# Patient Record
Sex: FEMALE | Race: WHITE | NOT HISPANIC OR LATINO | Employment: PART TIME | URBAN - METROPOLITAN AREA
[De-identification: names, ages, dates, MRNs, and addresses within clinical notes are randomized per-mention and may not be internally consistent; named-entity substitution may affect disease eponyms.]

---

## 2024-06-01 ENCOUNTER — HOSPITAL ENCOUNTER (EMERGENCY)
Facility: HOSPITAL | Age: 44
Discharge: HOME | End: 2024-06-01
Payer: COMMERCIAL

## 2024-06-01 VITALS
HEART RATE: 84 BPM | RESPIRATION RATE: 16 BRPM | DIASTOLIC BLOOD PRESSURE: 77 MMHG | SYSTOLIC BLOOD PRESSURE: 126 MMHG | WEIGHT: 160 LBS | TEMPERATURE: 98.2 F | BODY MASS INDEX: 26.63 KG/M2 | OXYGEN SATURATION: 97 %

## 2024-06-01 DIAGNOSIS — M65.4 DE QUERVAIN'S TENOSYNOVITIS, RIGHT: Primary | ICD-10-CM

## 2024-06-01 PROCEDURE — 99281 EMR DPT VST MAYX REQ PHY/QHP: CPT

## 2024-06-01 ASSESSMENT — COLUMBIA-SUICIDE SEVERITY RATING SCALE - C-SSRS
6. HAVE YOU EVER DONE ANYTHING, STARTED TO DO ANYTHING, OR PREPARED TO DO ANYTHING TO END YOUR LIFE?: NO
2. HAVE YOU ACTUALLY HAD ANY THOUGHTS OF KILLING YOURSELF?: NO
1. IN THE PAST MONTH, HAVE YOU WISHED YOU WERE DEAD OR WISHED YOU COULD GO TO SLEEP AND NOT WAKE UP?: NO

## 2024-06-01 NOTE — ED PROVIDER NOTES
HPI   Chief Complaint   Patient presents with    Hand Injury     Pt arrive private auto from work. States she injured hand trying to close a window yesterday and now today has pain in hand, wrist, forearm area that is shooting up and down. No open wound or bruising noted. Hx fany Godinez is a 43 y/o female with a past medical history of psoriatic arthritis on humira presenting to the emergency department with right wrist pain.  Patient states that yesterday evening, she was attempting to open her kitchen window when she felt a sharp shooting pain originating in her right thumb with radiation into her right wrist and forearm.  She states that the pain subsided and she was doing okay until this morning at work when she experienced a similar episode.  Patient was attempting to lift a heavy object while working at [employer] and again felt a sharp shooting from her thumb up her right arm.  The pain has since subsided.  She denies any associated numbness/tingling/weakness.  No prior fracture or injury to the right wrist.  Patient states that she does not believe this is her typical arthritis pains nor does she believe that she has a fracture.  She denies any history of de Quervain's or carpal tunnel, but does endorse repetitive lifting motions required by her job.  No fever, chills, nausea, vomiting, chest pain, shortness of breath.                          No data recorded                   Patient History   Past Medical History:   Diagnosis Date    Other conditions influencing health status     No significant past medical history    Personal history of other infectious and parasitic diseases     History of chickenpox     Past Surgical History:   Procedure Laterality Date    TUBAL LIGATION  11/16/2017    Tubal Ligation     No family history on file.  Social History     Tobacco Use    Smoking status: Not on file    Smokeless tobacco: Not on file   Substance Use Topics    Alcohol use: Not on file    Drug use:  Not on file       Physical Exam   ED Triage Vitals [06/01/24 1246]   Temperature Heart Rate Respirations BP   36.8 °C (98.2 °F) 84 16 126/77      Pulse Ox Temp src Heart Rate Source Patient Position   97 % -- -- --      BP Location FiO2 (%)     -- --       Physical Exam  HENT:      Mouth/Throat:      Mouth: Mucous membranes are moist.      Pharynx: Oropharynx is clear.   Eyes:      Extraocular Movements: Extraocular movements intact.   Cardiovascular:      Rate and Rhythm: Normal rate and regular rhythm.      Pulses: Normal pulses.      Heart sounds: Normal heart sounds.   Pulmonary:      Effort: Pulmonary effort is normal.      Breath sounds: Normal breath sounds.   Abdominal:      General: Abdomen is flat.      Palpations: Abdomen is soft.   Musculoskeletal:         General: No swelling, deformity or signs of injury.      Cervical back: Normal range of motion. No rigidity.      Comments: Overlying skin of right hand, wrist, and forearm is unremarkable.  No erythema, obvious swelling, or areas of drainage.  Patient does not have any bony tenderness palpation of the right hand, wrist, or forearm.  Negative Phalen's and Tinel's sign.  Positive Finkelstein's.  Patient is neurovascular intact distally.   Skin:     Findings: No bruising or erythema.   Neurological:      Mental Status: She is alert.         ED Course & MDM   Diagnoses as of 06/01/24 1303   De Quervain's tenosynovitis, right       Medical Decision Making  Gretchen is a 43 y/o female with a past medical history of psoriatic arthritis on humira presenting to the emergency department with right wrist pain.    Medical Decision Making: She did not appear ill or toxic.  Vital signs reviewed and stable.  Physical examination demonstrates positive Finkelstein's test.  I am highly suspicious for De Quervain's tenosynovitis in the setting of positive Finkelstein's and patient's reported history of repetitive lifting motions.  Through shared decision making, we  elected no x-ray imaging at this time.  Patient be discharged home and recommended to wear a thumb spica brace nightly for her symptoms.  I did also provide her with a referral to an orthopedic surgeon in the case she may need further intervention such as cortisone injections for her symptoms.  Patient to return the emergency room if she has any worsening pain, severe numbness/tingling, chest pain, or shortness of breath.  She was agreeable to plan and all question concerns were addressed.     Differential diagnoses considered: De Quervain's tenosynovitis, carpal tunnel, fracture, dislocation, soft tissue injury, arthritis, etc.     Medications given: No medications were given in the emergency room today.     Diagnosis: De Quervain's tenosynovitis, right    Plan: Discharge              Procedure  Procedures     Edita Monterroso PA-C  06/02/24 5623

## 2024-06-01 NOTE — DISCHARGE INSTRUCTIONS
You are seen the emergency room today after you opened a window and developed a shooting pain from your thumb up your arm.  I suspect your symptoms may be related to what is called de Quervain's tenosynovitis.  This can happen if somebody has repetitive motions of lifting, similar to what you do with your job and what you probably did with the window.  We recommend bracing for these symptoms with a thumb spica that can be purchased over-the-counter.  I will also provide you with a referral to an orthopedic surgeon for further management.  In some cases, people may require steroid injections to help with her symptoms.  Reasons to return the emergency room include worsening pain, numbness/tingling/weakness of the hand, chest pain, shortness of breath.

## 2024-07-11 ENCOUNTER — HOSPITAL ENCOUNTER (EMERGENCY)
Facility: HOSPITAL | Age: 44
Discharge: HOME | End: 2024-07-11
Attending: EMERGENCY MEDICINE
Payer: COMMERCIAL

## 2024-07-11 ENCOUNTER — APPOINTMENT (OUTPATIENT)
Dept: RADIOLOGY | Facility: HOSPITAL | Age: 44
End: 2024-07-11
Payer: COMMERCIAL

## 2024-07-11 VITALS
HEIGHT: 65 IN | DIASTOLIC BLOOD PRESSURE: 77 MMHG | OXYGEN SATURATION: 99 % | RESPIRATION RATE: 18 BRPM | SYSTOLIC BLOOD PRESSURE: 128 MMHG | WEIGHT: 195 LBS | BODY MASS INDEX: 32.49 KG/M2 | TEMPERATURE: 96.8 F | HEART RATE: 89 BPM

## 2024-07-11 DIAGNOSIS — R10.9 FLANK PAIN: Primary | ICD-10-CM

## 2024-07-11 LAB
ALBUMIN SERPL BCP-MCNC: 4.5 G/DL (ref 3.4–5)
ALP SERPL-CCNC: 59 U/L (ref 33–110)
ALT SERPL W P-5'-P-CCNC: 25 U/L (ref 7–45)
ANION GAP SERPL CALC-SCNC: 12 MMOL/L (ref 10–20)
APPEARANCE UR: CLEAR
AST SERPL W P-5'-P-CCNC: 17 U/L (ref 9–39)
B-HCG SERPL-ACNC: <2 MIU/ML
BASOPHILS # BLD AUTO: 0.08 X10*3/UL (ref 0–0.1)
BASOPHILS NFR BLD AUTO: 0.7 %
BILIRUB SERPL-MCNC: 0.5 MG/DL (ref 0–1.2)
BILIRUB UR STRIP.AUTO-MCNC: NEGATIVE MG/DL
BUN SERPL-MCNC: 9 MG/DL (ref 6–23)
CALCIUM SERPL-MCNC: 9 MG/DL (ref 8.6–10.3)
CHLORIDE SERPL-SCNC: 102 MMOL/L (ref 98–107)
CO2 SERPL-SCNC: 24 MMOL/L (ref 21–32)
COLOR UR: COLORLESS
CREAT SERPL-MCNC: 0.62 MG/DL (ref 0.5–1.05)
EGFRCR SERPLBLD CKD-EPI 2021: >90 ML/MIN/1.73M*2
EOSINOPHIL # BLD AUTO: 0.18 X10*3/UL (ref 0–0.7)
EOSINOPHIL NFR BLD AUTO: 1.5 %
ERYTHROCYTE [DISTWIDTH] IN BLOOD BY AUTOMATED COUNT: 13.2 % (ref 11.5–14.5)
GLUCOSE SERPL-MCNC: 81 MG/DL (ref 74–99)
GLUCOSE UR STRIP.AUTO-MCNC: NORMAL MG/DL
HCT VFR BLD AUTO: 42.2 % (ref 36–46)
HGB BLD-MCNC: 14.1 G/DL (ref 12–16)
IMM GRANULOCYTES # BLD AUTO: 0.04 X10*3/UL (ref 0–0.7)
IMM GRANULOCYTES NFR BLD AUTO: 0.3 % (ref 0–0.9)
KETONES UR STRIP.AUTO-MCNC: NEGATIVE MG/DL
LEUKOCYTE ESTERASE UR QL STRIP.AUTO: NEGATIVE
LIPASE SERPL-CCNC: 37 U/L (ref 9–82)
LYMPHOCYTES # BLD AUTO: 3.82 X10*3/UL (ref 1.2–4.8)
LYMPHOCYTES NFR BLD AUTO: 32.8 %
MCH RBC QN AUTO: 31.2 PG (ref 26–34)
MCHC RBC AUTO-ENTMCNC: 33.4 G/DL (ref 32–36)
MCV RBC AUTO: 93 FL (ref 80–100)
MONOCYTES # BLD AUTO: 0.72 X10*3/UL (ref 0.1–1)
MONOCYTES NFR BLD AUTO: 6.2 %
NEUTROPHILS # BLD AUTO: 6.81 X10*3/UL (ref 1.2–7.7)
NEUTROPHILS NFR BLD AUTO: 58.5 %
NITRITE UR QL STRIP.AUTO: NEGATIVE
NRBC BLD-RTO: 0 /100 WBCS (ref 0–0)
PH UR STRIP.AUTO: 5 [PH]
PLATELET # BLD AUTO: 376 X10*3/UL (ref 150–450)
POTASSIUM SERPL-SCNC: 4.2 MMOL/L (ref 3.5–5.3)
PROT SERPL-MCNC: 7.4 G/DL (ref 6.4–8.2)
PROT UR STRIP.AUTO-MCNC: NEGATIVE MG/DL
RBC # BLD AUTO: 4.52 X10*6/UL (ref 4–5.2)
RBC # UR STRIP.AUTO: NEGATIVE /UL
SODIUM SERPL-SCNC: 134 MMOL/L (ref 136–145)
SP GR UR STRIP.AUTO: 1
UROBILINOGEN UR STRIP.AUTO-MCNC: NORMAL MG/DL
WBC # BLD AUTO: 11.7 X10*3/UL (ref 4.4–11.3)

## 2024-07-11 PROCEDURE — 81003 URINALYSIS AUTO W/O SCOPE: CPT

## 2024-07-11 PROCEDURE — 80053 COMPREHEN METABOLIC PANEL: CPT

## 2024-07-11 PROCEDURE — 83690 ASSAY OF LIPASE: CPT

## 2024-07-11 PROCEDURE — 74177 CT ABD & PELVIS W/CONTRAST: CPT | Performed by: STUDENT IN AN ORGANIZED HEALTH CARE EDUCATION/TRAINING PROGRAM

## 2024-07-11 PROCEDURE — 96375 TX/PRO/DX INJ NEW DRUG ADDON: CPT

## 2024-07-11 PROCEDURE — 85025 COMPLETE CBC W/AUTO DIFF WBC: CPT

## 2024-07-11 PROCEDURE — 2500000004 HC RX 250 GENERAL PHARMACY W/ HCPCS (ALT 636 FOR OP/ED)

## 2024-07-11 PROCEDURE — 84702 CHORIONIC GONADOTROPIN TEST: CPT

## 2024-07-11 PROCEDURE — 2550000001 HC RX 255 CONTRASTS: Performed by: EMERGENCY MEDICINE

## 2024-07-11 PROCEDURE — 96361 HYDRATE IV INFUSION ADD-ON: CPT

## 2024-07-11 PROCEDURE — 96374 THER/PROPH/DIAG INJ IV PUSH: CPT | Mod: 59

## 2024-07-11 PROCEDURE — 99284 EMERGENCY DEPT VISIT MOD MDM: CPT | Mod: 25

## 2024-07-11 PROCEDURE — 36415 COLL VENOUS BLD VENIPUNCTURE: CPT

## 2024-07-11 PROCEDURE — 74177 CT ABD & PELVIS W/CONTRAST: CPT

## 2024-07-11 RX ORDER — MORPHINE SULFATE 4 MG/ML
4 INJECTION, SOLUTION INTRAMUSCULAR; INTRAVENOUS ONCE
Status: COMPLETED | OUTPATIENT
Start: 2024-07-11 | End: 2024-07-11

## 2024-07-11 RX ORDER — KETOROLAC TROMETHAMINE 30 MG/ML
15 INJECTION, SOLUTION INTRAMUSCULAR; INTRAVENOUS ONCE
Status: COMPLETED | OUTPATIENT
Start: 2024-07-11 | End: 2024-07-11

## 2024-07-11 RX ORDER — ONDANSETRON HYDROCHLORIDE 2 MG/ML
4 INJECTION, SOLUTION INTRAVENOUS ONCE
Status: COMPLETED | OUTPATIENT
Start: 2024-07-11 | End: 2024-07-11

## 2024-07-11 RX ADMIN — ONDANSETRON 4 MG: 2 INJECTION INTRAMUSCULAR; INTRAVENOUS at 18:30

## 2024-07-11 RX ADMIN — KETOROLAC TROMETHAMINE 15 MG: 30 INJECTION, SOLUTION INTRAMUSCULAR at 19:40

## 2024-07-11 RX ADMIN — IOHEXOL 75 ML: 350 INJECTION, SOLUTION INTRAVENOUS at 19:29

## 2024-07-11 RX ADMIN — MORPHINE SULFATE 4 MG: 4 INJECTION, SOLUTION INTRAMUSCULAR; INTRAVENOUS at 18:30

## 2024-07-11 RX ADMIN — SODIUM CHLORIDE 1000 ML: 9 INJECTION, SOLUTION INTRAVENOUS at 18:30

## 2024-07-11 ASSESSMENT — PAIN - FUNCTIONAL ASSESSMENT: PAIN_FUNCTIONAL_ASSESSMENT: 0-10

## 2024-07-11 ASSESSMENT — COLUMBIA-SUICIDE SEVERITY RATING SCALE - C-SSRS
1. IN THE PAST MONTH, HAVE YOU WISHED YOU WERE DEAD OR WISHED YOU COULD GO TO SLEEP AND NOT WAKE UP?: NO
2. HAVE YOU ACTUALLY HAD ANY THOUGHTS OF KILLING YOURSELF?: NO
6. HAVE YOU EVER DONE ANYTHING, STARTED TO DO ANYTHING, OR PREPARED TO DO ANYTHING TO END YOUR LIFE?: NO

## 2024-07-11 ASSESSMENT — PAIN SCALES - GENERAL: PAINLEVEL_OUTOF10: 3

## 2024-07-11 NOTE — ED PROVIDER NOTES
EMERGENCY DEPARTMENT ENCOUNTER      Pt Name: Gretchen Haji  MRN: [id number]  Birthdate 1980  Date of evaluation: 7/11/2024  Provider: Spencer Hatch DO    CHIEF COMPLAINT       Chief Complaint   Patient presents with    Flank Pain         HISTORY OF PRESENT ILLNESS    Is a 44-year-old female arrives via private vehicle with a chief complaint of right-sided flank pain as well as urinary frequency and a sensation of bladder fullness.  Patient states that her medical history is only significant for psoriasis and psoriatic arthritis for which she is taking long-term Humira as well as a past cholecystectomy.  She states that she started having right-sided back and flank pain approximately a week ago, additionally she started noticing she was urinating more frequently.  She denies hematuria, denies dysuria, denies burning in urination, she denies constipation or diarrhea, denies vomiting however states she does have some nausea.  At this time she rates the pain at a 6 out of 10.  Additionally she states she has paroxysmal suprapubic pain that is nonradiating when she does urinate on occasion.      History provided by:  Patient and medical records      Nursing Notes were reviewed.    PAST MEDICAL HISTORY     Past Medical History:   Diagnosis Date    Other conditions influencing health status     No significant past medical history    Personal history of other infectious and parasitic diseases     History of chickenpox         SURGICAL HISTORY       Past Surgical History:   Procedure Laterality Date    TUBAL LIGATION  11/16/2017    Tubal Ligation         CURRENT MEDICATIONS       Previous Medications    No medications on file       ALLERGIES     Patient has no known allergies.    FAMILY HISTORY     No family history on file.       SOCIAL HISTORY       Social History     Socioeconomic History    Marital status: Single     Social Determinants of Health     Financial Resource Strain: Patient Declined (3/13/2023)     Received from [facility]    Overall Financial Resource Strain (CARDIA)     Difficulty of Paying Living Expenses: Patient declined   Food Insecurity: Patient Declined (3/13/2023)    Received from [facility]    Hunger Vital Sign     Worried About Running Out of Food in the Last Year: Patient declined     Ran Out of Food in the Last Year: Patient declined   Transportation Needs: No Transportation Needs (3/13/2023)    Received from [facility]    PRAPARE - Transportation     Lack of Transportation (Medical): No     Lack of Transportation (Non-Medical): No   Physical Activity: Unknown (3/13/2023)    Received from [facility]    Exercise Vital Sign     Days of Exercise per Week: 5 days     Minutes of Exercise per Session: Patient declined   Stress: Stress Concern Present (3/13/2023)    Received from [facility]    [facility] of Occupational Health - Occupational Stress Questionnaire     Feeling of Stress : Rather much   Social Connections: Socially Isolated (3/13/2023)    Received from [facility]    Social Connection and Isolation Panel [NHANES]     Frequency of Communication with Friends and Family: Once a week     Frequency of Social Gatherings with Friends and Family: Never     Attends Anabaptist Services: Never     Active Member of Clubs or Organizations: No     Attends Club or Organization Meetings: Never     Marital Status: Living with partner   Intimate Partner Violence: Not At Risk (3/13/2023)    Received from [facility]    Humiliation, Afraid, Rape, and Kick questionnaire     Fear of Current or Ex-Partner: No     Emotionally Abused: No     Physically Abused: No     Sexually Abused: No   Housing Stability: Low Risk  (3/13/2023)    Received from [facility]    Housing Stability Vital Sign     Unable to Pay for Housing in the Last Year: No     Number of Places Lived in the Last Year: 1     Unstable  Housing in the Last Year: No       SCREENINGS                        PHYSICAL EXAM    (up to 7 for level 4, 8 or more for level 5)     ED Triage Vitals [07/11/24 1516]   Temperature Heart Rate Respirations BP   36 °C (96.8 °F) 97 18 133/60      Pulse Ox Temp src Heart Rate Source Patient Position   100 % -- -- --      BP Location FiO2 (%)     -- --       Physical Exam  Vitals and nursing note reviewed.   Constitutional:       General: She is not in acute distress.     Appearance: She is well-developed and normal weight. She is not ill-appearing or toxic-appearing.   HENT:      Head: Normocephalic and atraumatic.      Nose: Nose normal. No congestion.      Mouth/Throat:      Mouth: Mucous membranes are moist.      Pharynx: Oropharynx is clear. No oropharyngeal exudate.   Eyes:      General:         Right eye: No discharge.         Left eye: No discharge.      Extraocular Movements: Extraocular movements intact.      Conjunctiva/sclera: Conjunctivae normal.      Pupils: Pupils are equal, round, and reactive to light.   Cardiovascular:      Rate and Rhythm: Normal rate and regular rhythm.      Pulses: Normal pulses.      Heart sounds: Normal heart sounds. No murmur heard.  Pulmonary:      Effort: Pulmonary effort is normal. No respiratory distress.      Breath sounds: Normal breath sounds. No wheezing or rales.   Abdominal:      General: Abdomen is flat. Bowel sounds are normal. There is no distension.      Palpations: Abdomen is soft. There is no mass.      Tenderness: There is no abdominal tenderness. There is no right CVA tenderness, left CVA tenderness, guarding or rebound.      Hernia: No hernia is present.   Musculoskeletal:         General: No swelling, tenderness, deformity or signs of injury.      Cervical back: Normal range of motion and neck supple.   Skin:     General: Skin is warm and dry.      Capillary Refill: Capillary refill takes less than 2 seconds.      Coloration: Skin is not jaundiced or pale.       Findings: No bruising, erythema or lesion.   Neurological:      General: No focal deficit present.      Mental Status: She is alert and oriented to person, place, and time.   Psychiatric:         Mood and Affect: Mood normal.         Behavior: Behavior normal.         Thought Content: Thought content normal.         Judgment: Judgment normal.          DIAGNOSTIC RESULTS     LABS:  Labs Reviewed   CBC WITH AUTO DIFFERENTIAL   COMPREHENSIVE METABOLIC PANEL   LIPASE   URINALYSIS WITH REFLEX CULTURE AND MICROSCOPIC    Narrative:     The following orders were created for panel order Urinalysis with Reflex Culture and Microscopic.  Procedure                               Abnormality         Status                     ---------                               -----------         ------                     Urinalysis with Reflex C...[[id number]]                                                 Extra Urine Gray Tube[[id number]]                                                         Please view results for these tests on the individual orders.   HUMAN CHORIONIC GONADOTROPIN, SERUM QUANTITATIVE   URINALYSIS WITH REFLEX CULTURE AND MICROSCOPIC   EXTRA URINE GRAY TUBE       All other labs were within normal range or not returned as of this dictation.    Imaging  CT abdomen pelvis w IV contrast    (Results Pending)        Procedures  Procedures     EMERGENCY DEPARTMENT COURSE/MDM:     ED Course as of 07/11/24 2100   Thu Jul 11, 2024   1828 Pt seen with resident - 44yF p/w flank pain x days, now with additional urinary frequency/sense of incomplete voiding.  No fever or hematuria.  No vaginal discharge or concern for STD.  Will obtain UA, labs, CT and treat her pain. [EL]   1904 Labs reviewed by me, borderline leukocytosis, no electrolyte normalities or ADRIAN, UA without UTI, normal lipase. [EL]   1906 Urinalysis showed no signs of urinary tract infection, no blood in urine noted.,  There is a mild leukocytosis with a WBC of 11.7, no  neutrophilic predominance.  Normal renal and hepatic function.  Patient's lipase was negative.  No acute electrolyte imbalance noted.  This time we are pending hCG results as well as CT abdomen pelvis. [PV]   1914 This time beta-hCG was negative.  Patient is not pregnant. [PV]   1914 Patient is pending CT abdomen pelvis with contrast at this time.  She states that her pain was well-managed with morphine and Zofran, has had no episodes of emesis. [PV]   2059 This time after being given a dose of Toradol CT scan shows no acute abdominal pathology.  Patient states she is feeling better.  The patient was given follow-up with her primary care provider, given strict return precautions.  Patient states she feels improved, states she will follow-up with primary care, understands plan of care.  Patient discharged home. [PV]      ED Course User Index  [EL] Elyse H Lerman, MD  [PV] Spencer Hatch DO         Diagnoses as of 07/11/24 2100   Flank pain        Medical Decision Making  This is a 44-year-old female who arrives with chief complaint of 1 week of right-sided flank pain, paroxysmal suprapubic cramping, urinary frequency, and a feeling of not fully voiding.  Patient states she has no history of kidney stones however she is concerned with 1.  She also states she has no concern for STD, and denies any vaginal discomfort or discharge.  A CBC, CMP, lipase, beta-hCG, CT of the abdomen pelvis with IV contrast were ordered as the patient states she has never had this pain before.  Dose of morphine was given for analgesia, Zofran for nausea prophylaxis and for nausea treatment.  Please see ED course for further medical decision making.    Reviewed and discussed with attending physician      Spencer Hatch DO  PGY-3 Emergency Medicine        Patient and or family in agreement and understanding of treatment plan.  All questions answered.      I reviewed the case with the attending ED physician. The attending ED  physician agrees with the plan. Patient and/or patient´s representative was counseled regarding labs, imaging, likely diagnosis, and plan. All questions were answered.    ED Medications administered this visit:    Medications   ondansetron (Zofran) injection 4 mg (has no administration in time range)   morphine injection 4 mg (has no administration in time range)   sodium chloride 0.9 % bolus 1,000 mL (has no administration in time range)       New Prescriptions from this visit:    New Prescriptions    No medications on file       Follow-up:  No follow-up provider specified.      Final Impression: No diagnosis found.      (Please note that portions of this note were completed with a voice recognition program.  Efforts were made to edit the dictations but occasionally words are mis-transcribed.)     Spencer Hatch DO  Resident  07/11/24 2012       Spencer Hatch DO  Resident  07/11/24 2100       Spencer Hatch DO  Resident  07/11/24 4619

## 2024-07-12 LAB — HOLD SPECIMEN: NORMAL

## 2024-07-12 NOTE — DISCHARGE INSTRUCTIONS
Please ensure that you follow-up with a primary care provider as soon as possible to let them know you are seen evaluated in the emergency department and to establish care.  Please return to the emergency department if you have significant worsening of your pain, if you have episodes of vomiting that cannot be controlled, if you start noticing bright red blood in your urine or in your stool.  You can always return to the emergency department anytime if you need to be reevaluated.

## 2024-08-15 ENCOUNTER — APPOINTMENT (OUTPATIENT)
Dept: PRIMARY CARE | Facility: CLINIC | Age: 44
End: 2024-08-15
Payer: COMMERCIAL

## 2024-09-10 ENCOUNTER — APPOINTMENT (OUTPATIENT)
Dept: PRIMARY CARE | Facility: CLINIC | Age: 44
End: 2024-09-10
Payer: COMMERCIAL

## 2024-09-10 VITALS
OXYGEN SATURATION: 98 % | HEIGHT: 65 IN | SYSTOLIC BLOOD PRESSURE: 120 MMHG | HEART RATE: 74 BPM | TEMPERATURE: 96.8 F | BODY MASS INDEX: 32.42 KG/M2 | WEIGHT: 194.6 LBS | DIASTOLIC BLOOD PRESSURE: 64 MMHG

## 2024-09-10 DIAGNOSIS — L40.9 PSORIASIS: ICD-10-CM

## 2024-09-10 DIAGNOSIS — F41.9 ANXIETY: ICD-10-CM

## 2024-09-10 DIAGNOSIS — K59.1 FUNCTIONAL DIARRHEA: ICD-10-CM

## 2024-09-10 DIAGNOSIS — L40.50 ARTHROPATHIC PSORIASIS, UNSPECIFIED (MULTI): ICD-10-CM

## 2024-09-10 DIAGNOSIS — R40.0 DAYTIME SLEEPINESS: ICD-10-CM

## 2024-09-10 DIAGNOSIS — Z00.00 ANNUAL PHYSICAL EXAM: Primary | ICD-10-CM

## 2024-09-10 DIAGNOSIS — M15.3 OTHER SECONDARY OSTEOARTHRITIS OF MULTIPLE SITES: ICD-10-CM

## 2024-09-10 DIAGNOSIS — Z12.31 BREAST CANCER SCREENING BY MAMMOGRAM: ICD-10-CM

## 2024-09-10 DIAGNOSIS — Z79.620 ADALIMUMAB (HUMIRA) LONG-TERM USE: ICD-10-CM

## 2024-09-10 PROBLEM — M19.90 OSTEOARTHRITIS: Status: ACTIVE | Noted: 2024-09-10

## 2024-09-10 PROCEDURE — 1036F TOBACCO NON-USER: CPT | Performed by: NURSE PRACTITIONER

## 2024-09-10 PROCEDURE — 3008F BODY MASS INDEX DOCD: CPT | Performed by: NURSE PRACTITIONER

## 2024-09-10 PROCEDURE — 99386 PREV VISIT NEW AGE 40-64: CPT | Performed by: NURSE PRACTITIONER

## 2024-09-10 RX ORDER — ADALIMUMAB 40MG/0.4ML
40 KIT SUBCUTANEOUS ONCE
COMMUNITY
Start: 2024-04-30

## 2024-09-10 ASSESSMENT — ENCOUNTER SYMPTOMS
LOSS OF SENSATION IN FEET: 0
OCCASIONAL FEELINGS OF UNSTEADINESS: 0
DEPRESSION: 0

## 2024-09-10 ASSESSMENT — PATIENT HEALTH QUESTIONNAIRE - PHQ9
1. LITTLE INTEREST OR PLEASURE IN DOING THINGS: NOT AT ALL
2. FEELING DOWN, DEPRESSED OR HOPELESS: NOT AT ALL
SUM OF ALL RESPONSES TO PHQ9 QUESTIONS 1 AND 2: 0

## 2024-09-10 ASSESSMENT — PAIN SCALES - GENERAL: PAINLEVEL: 0-NO PAIN

## 2024-09-10 NOTE — PROGRESS NOTES
"Subjective   Patient ID: Gretchen Haji is a 44 y.o. female who presents for New Patient Visit and Annual Exam.    HPI  This is a pleasant 45 y/o female with PMH Arthropathic psoriasis taking Humira who presents to [facility] care and for Annual exam  Last Annual >10 years, does not have a PCP    Current concerns  Fatigue-over 1 year, endorses daytime sleepiness, can fall asleep watching tv or if sitting still for 10 minutes in waiting room. Feels she has no energy, wakes up tired. Unknown if apneic,  states she snores    Arthropathic psoriasis- follows with Rheumatology at [facility], Dr Villarreal. Taking Humira     Chronic diarrhea- For over 10 years, reports everything she eats \"goes right through\", does not know what makes better or worse. Endorses cramping, gas, belching as well as  hemorrhoids at time bleeding. Has not had a colonoscopy. Denies family history of colon cancer or Crohn's, sister with colitis.    Insomnia- endorses trouble staying asleep. Reviewed sleep routine    Single, BF  Works at [employer] ages 17, 19, 24    Diet depends, some days one meal, other days snacks and dinner. Does not eat healthy  Caffeine 2 coffee with sugar  Water 12 oz x 4  Exercise no    Non-smoker  Alcohol No      Eye exam >10 years  Dental exam 2024  Gyn Hx  abnormal pap>10  Colonoscopy no  Mammogram no, refer      Review of Systems  Review of Systems   Constitutional:HPI    HENT: Negative.     Respiratory: Negative.     Cardiovascular: Negative.    Gastrointestinal: HPI   Genitourinary: Negative.    Musculoskeletal: Negative.    Psychiatric/Behavioral: Negative.     All other systems reviewed and are negative.    .vsVisit Vitals  /64 (BP Location: Left arm, Patient Position: Sitting)   Pulse 74   Temp 36 °C (96.8 °F)   Ht 1.651 m (5' 5\")   Wt 88.3 kg (194 lb 9.6 oz)   LMP 08/10/2024   SpO2 98%   BMI 32.38 kg/m²   OB Status Having periods   Smoking Status Never   BSA 2.01 m²         Objective   Physical " Exam  Vitals reviewed.   Constitutional:       Appearance: Normal appearance.   HENT:      Head: Normocephalic and atraumatic.      Right Ear: Tympanic membrane and ear canal normal.      Left Ear: Tympanic membrane and ear canal normal.      Nose: Nose normal.      Mouth/Throat:      Pharynx: Oropharynx is clear.   Eyes:      Extraocular Movements: Extraocular movements intact.      Conjunctiva/sclera: Conjunctivae normal.      Pupils: Pupils are equal, round, and reactive to light.   Cardiovascular:      Rate and Rhythm: Normal rate and regular rhythm.      Pulses: Normal pulses.      Heart sounds: Normal heart sounds.   Pulmonary:      Effort: Pulmonary effort is normal.      Breath sounds: Normal breath sounds. No wheezing.   Abdominal:      General: Bowel sounds are normal. There is no distension.      Palpations: Abdomen is soft.      Tenderness: There is abdominal tenderness. There is guarding.      Hernia: No hernia is present.   Musculoskeletal:         General: Normal range of motion.      Cervical back: Normal range of motion and neck supple.   Skin:     General: Skin is warm.      Capillary Refill: Capillary refill takes 2 to 3 seconds.   Neurological:      General: No focal deficit present.      Mental Status: She is alert.   Psychiatric:         Mood and Affect: Mood normal.         Assessment/Plan   Problem List Items Addressed This Visit       Annual physical exam - Primary    Relevant Orders    CBC    Comprehensive Metabolic Panel    Vitamin D 25-Hydroxy,Total (for eval of Vitamin D levels)    TSH with reflex to Free T4 if abnormal    Hemoglobin A1C    Lipid Panel    BI mammo bilateral screening tomosynthesis    Referral to Gynecology    Arthropathic psoriasis, unspecified (Multi)     Follows with Rheumatology at [facility], Dr Villarreal  Taking Humira          Relevant Medications    Humira,CF, Pen 40 mg/0.4 mL pen injector kit pen-injector    Adalimumab (Humira) long-term use    Relevant Medications     Humira,CF, Pen 40 mg/0.4 mL pen injector kit pen-injector    Osteoarthritis    Anxiety     Stable         Psoriasis    Daytime sleepiness     Handouts provided and reviewed with patient, discussed           Relevant Orders    Referral to Adult Sleep Medicine    Functional diarrhea    Relevant Orders    CBC    Comprehensive Metabolic Panel    Referral to Gastroenterology    Breast cancer screening by mammogram    Relevant Orders    BI mammo bilateral screening tomosynthesis

## 2024-09-10 NOTE — PATIENT INSTRUCTIONS
Return for Fasting Labs  Nothing to eat or drink for 10 hours. Black coffee, black tea or water is ok    Health Maintenance  Continue to follow a healthy diet with fruits and vegetables at most meals.  Daily exercise is recommended as well as adding weights 3 times a week to maintain muscle mass and for bone health.  It is recommended you drink 6 to 8 glasses of water daily, more when you are exerting yourself or in hot weather.  Make sure you follow the health screening guidelines and keep up to date on your immunizations!    Insomnia  Begin a bedtime ritual. This helps to get your brain and body ready to fall asleep and performing the same tasks let your brain know sleep is coming.   One hour before sleep: Turn off the television, computer, and cell phone. Screens stimulate the brain to awaken and TV's are not recommended in the bedroom.   Dim lights, begin calming activities such as reading, meditating, or taking a warm bath or shower. If you use melatonin or sleep medications take them now.   Listening to white noise such as a fan or white noise machine can help.  Try to go to sleep and wake up at the same times each day in order to establish a routine.  Limit alcohol and stop caffeine several hours before sleep.    Diarrhea is usually caused by a virus, bacteria, or parasite or sometimes contaminated food or artificial sweeteners.   It can also be caused by stress, liquid diets, anxiety, and food intolerance.   Symptoms include frequent loose, watery stools and belly pain lasting 3 or more days. Most cases will clear up on there own.   The best at home treatment is increasing your fluid consumption so you do not become dehydrated. Stick to broth, tea, water, and electrolyte replacements like Gatorade.   Small frequent sips work best and help prevent stomach upset.

## 2024-09-13 ENCOUNTER — HOSPITAL ENCOUNTER (OUTPATIENT)
Dept: RADIOLOGY | Facility: CLINIC | Age: 44
Discharge: HOME | End: 2024-09-13
Payer: COMMERCIAL

## 2024-09-13 ENCOUNTER — CLINICAL SUPPORT (OUTPATIENT)
Dept: PRIMARY CARE | Facility: CLINIC | Age: 44
End: 2024-09-13
Payer: COMMERCIAL

## 2024-09-13 VITALS — WEIGHT: 194.67 LBS | BODY MASS INDEX: 32.43 KG/M2 | HEIGHT: 65 IN

## 2024-09-13 DIAGNOSIS — K59.1 FUNCTIONAL DIARRHEA: ICD-10-CM

## 2024-09-13 DIAGNOSIS — Z00.00 ANNUAL PHYSICAL EXAM: ICD-10-CM

## 2024-09-13 DIAGNOSIS — Z12.31 BREAST CANCER SCREENING BY MAMMOGRAM: ICD-10-CM

## 2024-09-13 LAB
25(OH)D3 SERPL-MCNC: 49 NG/ML (ref 30–100)
ALBUMIN SERPL BCP-MCNC: 4.3 G/DL (ref 3.4–5)
ALP SERPL-CCNC: 63 U/L (ref 33–110)
ALT SERPL W P-5'-P-CCNC: 19 U/L (ref 7–45)
ANION GAP SERPL CALC-SCNC: 13 MMOL/L (ref 10–20)
AST SERPL W P-5'-P-CCNC: 15 U/L (ref 9–39)
BILIRUB SERPL-MCNC: 0.6 MG/DL (ref 0–1.2)
BUN SERPL-MCNC: 10 MG/DL (ref 6–23)
CALCIUM SERPL-MCNC: 9 MG/DL (ref 8.6–10.6)
CHLORIDE SERPL-SCNC: 101 MMOL/L (ref 98–107)
CHOLEST SERPL-MCNC: 175 MG/DL (ref 0–199)
CHOLESTEROL/HDL RATIO: 2.6
CO2 SERPL-SCNC: 27 MMOL/L (ref 21–32)
CREAT SERPL-MCNC: 0.6 MG/DL (ref 0.5–1.05)
EGFRCR SERPLBLD CKD-EPI 2021: >90 ML/MIN/1.73M*2
ERYTHROCYTE [DISTWIDTH] IN BLOOD BY AUTOMATED COUNT: 13.7 % (ref 11.5–14.5)
EST. AVERAGE GLUCOSE BLD GHB EST-MCNC: 105 MG/DL
GLUCOSE SERPL-MCNC: 90 MG/DL (ref 74–99)
HBA1C MFR BLD: 5.3 %
HCT VFR BLD AUTO: 45.8 % (ref 36–46)
HDLC SERPL-MCNC: 66.7 MG/DL
HGB BLD-MCNC: 13.8 G/DL (ref 12–16)
LDLC SERPL CALC-MCNC: 93 MG/DL
MCH RBC QN AUTO: 29.6 PG (ref 26–34)
MCHC RBC AUTO-ENTMCNC: 30.1 G/DL (ref 32–36)
MCV RBC AUTO: 98 FL (ref 80–100)
NON HDL CHOLESTEROL: 108 MG/DL (ref 0–149)
NRBC BLD-RTO: 0 /100 WBCS (ref 0–0)
PLATELET # BLD AUTO: 365 X10*3/UL (ref 150–450)
POTASSIUM SERPL-SCNC: 4.7 MMOL/L (ref 3.5–5.3)
PROT SERPL-MCNC: 6.9 G/DL (ref 6.4–8.2)
RBC # BLD AUTO: 4.67 X10*6/UL (ref 4–5.2)
SODIUM SERPL-SCNC: 136 MMOL/L (ref 136–145)
TRIGL SERPL-MCNC: 78 MG/DL (ref 0–149)
TSH SERPL-ACNC: 0.81 MIU/L (ref 0.44–3.98)
VLDL: 16 MG/DL (ref 0–40)
WBC # BLD AUTO: 6.1 X10*3/UL (ref 4.4–11.3)

## 2024-09-13 PROCEDURE — 82306 VITAMIN D 25 HYDROXY: CPT

## 2024-09-13 PROCEDURE — 83036 HEMOGLOBIN GLYCOSYLATED A1C: CPT

## 2024-09-13 PROCEDURE — 80053 COMPREHEN METABOLIC PANEL: CPT

## 2024-09-13 PROCEDURE — 84443 ASSAY THYROID STIM HORMONE: CPT

## 2024-09-13 PROCEDURE — 77067 SCR MAMMO BI INCL CAD: CPT

## 2024-09-13 PROCEDURE — 80061 LIPID PANEL: CPT

## 2024-09-13 PROCEDURE — 85027 COMPLETE CBC AUTOMATED: CPT

## 2024-09-16 DIAGNOSIS — R92.8 ABNORMAL MAMMOGRAM: Primary | ICD-10-CM

## 2024-09-19 ENCOUNTER — TELEPHONE (OUTPATIENT)
Dept: PRIMARY CARE | Facility: CLINIC | Age: 44
End: 2024-09-19
Payer: COMMERCIAL

## 2024-09-19 NOTE — TELEPHONE ENCOUNTER
Patient was seen in the ER 7/24 for pelvic pain a CT was done, and was told to follow up with PCP .Patient called in and would like a referral for US of the pelvic she states they found  a cyst on her ovaries bilateral . She will call to make an appointment with GYN

## 2024-09-20 ENCOUNTER — HOSPITAL ENCOUNTER (OUTPATIENT)
Dept: RADIOLOGY | Facility: CLINIC | Age: 44
Discharge: HOME | End: 2024-09-20
Payer: COMMERCIAL

## 2024-09-20 DIAGNOSIS — R92.8 ABNORMAL MAMMOGRAM: ICD-10-CM

## 2024-09-20 PROCEDURE — 76642 ULTRASOUND BREAST LIMITED: CPT | Mod: LT

## 2024-09-20 PROCEDURE — 76982 USE 1ST TARGET LESION: CPT | Mod: LT

## 2024-10-15 ENCOUNTER — APPOINTMENT (OUTPATIENT)
Dept: OBSTETRICS AND GYNECOLOGY | Facility: CLINIC | Age: 44
End: 2024-10-15
Payer: COMMERCIAL

## 2024-10-15 VITALS
WEIGHT: 195.38 LBS | BODY MASS INDEX: 32.55 KG/M2 | HEIGHT: 65 IN | DIASTOLIC BLOOD PRESSURE: 74 MMHG | SYSTOLIC BLOOD PRESSURE: 108 MMHG

## 2024-10-15 DIAGNOSIS — Z00.00 ANNUAL PHYSICAL EXAM: ICD-10-CM

## 2024-10-15 DIAGNOSIS — R10.31 RIGHT LOWER QUADRANT ABDOMINAL PAIN: Primary | ICD-10-CM

## 2024-10-15 DIAGNOSIS — Z01.419 WELL WOMAN EXAM WITH ROUTINE GYNECOLOGICAL EXAM: ICD-10-CM

## 2024-10-15 DIAGNOSIS — L40.9 PSORIASIS OF VULVA: ICD-10-CM

## 2024-10-15 PROCEDURE — 1036F TOBACCO NON-USER: CPT | Performed by: ADVANCED PRACTICE MIDWIFE

## 2024-10-15 PROCEDURE — 3008F BODY MASS INDEX DOCD: CPT | Performed by: ADVANCED PRACTICE MIDWIFE

## 2024-10-15 PROCEDURE — 99386 PREV VISIT NEW AGE 40-64: CPT | Performed by: ADVANCED PRACTICE MIDWIFE

## 2024-10-15 PROCEDURE — 87624 HPV HI-RISK TYP POOLED RSLT: CPT

## 2024-10-15 RX ORDER — CLOBETASOL PROPIONATE 0.5 MG/G
OINTMENT TOPICAL 2 TIMES DAILY
Qty: 60 G | Refills: 3 | Status: SHIPPED | OUTPATIENT
Start: 2024-10-15 | End: 2025-10-15

## 2024-10-15 ASSESSMENT — PATIENT HEALTH QUESTIONNAIRE - PHQ9
7. TROUBLE CONCENTRATING ON THINGS, SUCH AS READING THE NEWSPAPER OR WATCHING TELEVISION: SEVERAL DAYS
6. FEELING BAD ABOUT YOURSELF - OR THAT YOU ARE A FAILURE OR HAVE LET YOURSELF OR YOUR FAMILY DOWN: NOT AT ALL
SUM OF ALL RESPONSES TO PHQ9 QUESTIONS 1 AND 2: 3
4. FEELING TIRED OR HAVING LITTLE ENERGY: NEARLY EVERY DAY
2. FEELING DOWN, DEPRESSED OR HOPELESS: NOT AT ALL
5. POOR APPETITE OR OVEREATING: NOT AT ALL
SUM OF ALL RESPONSES TO PHQ QUESTIONS 1-9: 7
1. LITTLE INTEREST OR PLEASURE IN DOING THINGS: NEARLY EVERY DAY
9. THOUGHTS THAT YOU WOULD BE BETTER OFF DEAD, OR OF HURTING YOURSELF: NOT AT ALL
8. MOVING OR SPEAKING SO SLOWLY THAT OTHER PEOPLE COULD HAVE NOTICED. OR THE OPPOSITE, BEING SO FIGETY OR RESTLESS THAT YOU HAVE BEEN MOVING AROUND A LOT MORE THAN USUAL: NOT AT ALL
3. TROUBLE FALLING OR STAYING ASLEEP OR SLEEPING TOO MUCH: NOT AT ALL

## 2024-10-15 NOTE — PROGRESS NOTES
"Subjective   Gretchen Haji is a 44 y.o. female who is here for a routine well woman exam.     Concerns today:  Was seen in ER in July RLQ radiating to her right lower back, on CT of abdomen, a left adnexal cyst measuring 4.1 cm was noted. Still having lower abdominal pain, more so in the RLQ. Currently being worked up for suspicion of colitis, goes to GI next month () for further testing.      Patient's last menstrual period was 2024 (approximate).   Periods are regular every 28-30 days, lasting 5 days, first 2 days heaviest, then tapers off.   Dysmenorrhea:mild, occurring premenstrually and first 1-2 days of flow.   Cyclic symptoms include bloating, headache, and pelvic pain.     Sexual Activity: sexually active, male partners; Patient reports 1 partners in the last 12 months. Very infrequent  Pain with intercourse? Yes with insertion and deep penetration  Loss of desire? Yes   Able to have an orgasm? Yes   Concern for STI exposure?:  No     History of prior STI: none    Current contraception: condoms and tubal ligation    Last pap: uncertain  History of abnormal Pap smear: yes - cannot remember, maybe had a colposcopy but never had to have treatment for dysplasia  Treatment for cervical dysplasia: no    Last mammogram: 2024  History of abnormal mammogram: yes - additional imaging was done, benign cyst in left breast, no additional imaging needed  Family history of breast cancer or ovarian cancer: no    Menstrual History:  OB History          4    Para   3    Term   3            AB   1    Living   3         SAB        IAB   1    Ectopic        Multiple        Live Births   3                Menarche age: 17  Patient's last menstrual period was 2024 (approximate).         Objective   /74   Ht 1.638 m (5' 4.5\")   Wt 88.6 kg (195 lb 6 oz)   LMP 2024 (Approximate)   BMI 33.02 kg/m²     Physical Exam  Constitutional:       Appearance: Normal appearance.   HENT:      " Head: Normocephalic.      Nose: Nose normal.      Mouth/Throat:      Mouth: Mucous membranes are moist.      Pharynx: Oropharynx is clear.   Eyes:      Conjunctiva/sclera: Conjunctivae normal.   Cardiovascular:      Rate and Rhythm: Normal rate and regular rhythm.   Pulmonary:      Effort: Pulmonary effort is normal.      Breath sounds: Normal breath sounds.   Abdominal:      General: Abdomen is flat.      Palpations: Abdomen is soft.   Genitourinary:     Labia:         Right: Tenderness and lesion present.         Left: Tenderness and lesion present.       Vagina: Normal.      Cervix: Discharge present.      Uterus: Normal.       Comments: Psoriasis on both labia, bright red, inflamed, sore  Musculoskeletal:         General: Normal range of motion.      Cervical back: Normal range of motion and neck supple.   Skin:     General: Skin is warm and dry.   Neurological:      Mental Status: She is alert.   Psychiatric:         Mood and Affect: Mood normal.         Behavior: Behavior normal.          Assessment/Plan   Normal well woman exam  Continue healthy lifestyle habits  Consider taking a multivitamin daily  Continue recommended women's health screenings  Mammogram UTD, due 2025  Pap today, If normal, repeat in 5 yrs  Right lower quadrant pain  Pelvic U/S to R/O GYN etiology and f/u left adnexal cyst  Vulvar psoriasis  Prescription for Clobetasol ointment sent to pharmacy, pt instructed on use      Return to care for annual exam or sooner as needed.    HEATHER Sidhu-LONNIE

## 2024-10-19 ENCOUNTER — HOSPITAL ENCOUNTER (OUTPATIENT)
Dept: RADIOLOGY | Facility: HOSPITAL | Age: 44
Discharge: HOME | End: 2024-10-19
Payer: COMMERCIAL

## 2024-10-19 DIAGNOSIS — R10.31 RIGHT LOWER QUADRANT ABDOMINAL PAIN: ICD-10-CM

## 2024-10-19 PROCEDURE — 76856 US EXAM PELVIC COMPLETE: CPT

## 2024-10-19 PROCEDURE — 76830 TRANSVAGINAL US NON-OB: CPT | Performed by: RADIOLOGY

## 2024-10-19 PROCEDURE — 76856 US EXAM PELVIC COMPLETE: CPT | Performed by: RADIOLOGY

## 2024-10-22 LAB
CYTOLOGY CMNT CVX/VAG CYTO-IMP: NORMAL
HPV HR 12 DNA GENITAL QL NAA+PROBE: NEGATIVE
HPV HR GENOTYPES PNL CVX NAA+PROBE: NEGATIVE
HPV16 DNA SPEC QL NAA+PROBE: NEGATIVE
HPV18 DNA SPEC QL NAA+PROBE: NEGATIVE
LAB AP HPV GENOTYPE QUESTION: YES
LAB AP HPV HR: NORMAL
LABORATORY COMMENT REPORT: NORMAL
PATH REPORT.TOTAL CANCER: NORMAL

## 2024-11-06 NOTE — PROGRESS NOTES
Last gyn: 10/15/2024 w [facility] for ED followup of 4.1 cm L adnexal cyst  Pap: 10/15/2024 unremarkable w/ co-testing  Mammogram: 2024 w US/L breast cyst  Colonoscopy:   Contraception:     Assessment/plan:

## 2024-11-11 ENCOUNTER — LAB (OUTPATIENT)
Dept: LAB | Facility: LAB | Age: 44
End: 2024-11-11
Payer: COMMERCIAL

## 2024-11-11 ENCOUNTER — OFFICE VISIT (OUTPATIENT)
Dept: GASTROENTEROLOGY | Facility: CLINIC | Age: 44
End: 2024-11-11
Payer: COMMERCIAL

## 2024-11-11 VITALS
TEMPERATURE: 98 F | SYSTOLIC BLOOD PRESSURE: 119 MMHG | DIASTOLIC BLOOD PRESSURE: 75 MMHG | HEART RATE: 48 BPM | BODY MASS INDEX: 32.82 KG/M2 | HEIGHT: 65 IN | WEIGHT: 197 LBS

## 2024-11-11 DIAGNOSIS — K52.9 CHRONIC DIARRHEA: ICD-10-CM

## 2024-11-11 DIAGNOSIS — K52.9 CHRONIC DIARRHEA: Primary | ICD-10-CM

## 2024-11-11 DIAGNOSIS — K59.1 FUNCTIONAL DIARRHEA: ICD-10-CM

## 2024-11-11 PROCEDURE — 99204 OFFICE O/P NEW MOD 45 MIN: CPT | Performed by: NURSE PRACTITIONER

## 2024-11-11 PROCEDURE — 36415 COLL VENOUS BLD VENIPUNCTURE: CPT

## 2024-11-11 PROCEDURE — 80053 COMPREHEN METABOLIC PANEL: CPT

## 2024-11-11 PROCEDURE — 86140 C-REACTIVE PROTEIN: CPT

## 2024-11-11 PROCEDURE — 1036F TOBACCO NON-USER: CPT | Performed by: NURSE PRACTITIONER

## 2024-11-11 PROCEDURE — 85027 COMPLETE CBC AUTOMATED: CPT

## 2024-11-11 PROCEDURE — 83516 IMMUNOASSAY NONANTIBODY: CPT

## 2024-11-11 PROCEDURE — 99214 OFFICE O/P EST MOD 30 MIN: CPT | Performed by: NURSE PRACTITIONER

## 2024-11-11 PROCEDURE — 3008F BODY MASS INDEX DOCD: CPT | Performed by: NURSE PRACTITIONER

## 2024-11-11 RX ORDER — HYOSCYAMINE SULFATE 0.125 MG
0.12 TABLET ORAL EVERY 6 HOURS PRN
Qty: 100 TABLET | Refills: 11 | Status: SHIPPED | OUTPATIENT
Start: 2024-11-11 | End: 2025-11-11

## 2024-11-11 ASSESSMENT — ENCOUNTER SYMPTOMS
MUSCULOSKELETAL NEGATIVE: 1
FEVER: 0
DIFFICULTY URINATING: 0
ROS GI COMMENTS: SEE HPI
SHORTNESS OF BREATH: 0
EYES NEGATIVE: 1
ENDOCRINE NEGATIVE: 1
ALLERGIC/IMMUNOLOGIC NEGATIVE: 1
CHEST TIGHTNESS: 0
PSYCHIATRIC NEGATIVE: 1
NEUROLOGICAL NEGATIVE: 1
COUGH: 0
WHEEZING: 0
CHILLS: 0
APNEA: 0
CARDIOVASCULAR NEGATIVE: 1
STRIDOR: 0
DIAPHORESIS: 0
FATIGUE: 0
HEMATOLOGIC/LYMPHATIC NEGATIVE: 1
RESPIRATORY NEGATIVE: 1

## 2024-11-11 NOTE — PATIENT INSTRUCTIONS
Start benefiber one teaspoon daily     Start hyoscyamine as needed     Complete stool tests and blood work ASAP     Complete colonoscopy    You will be scheduled for a colonoscopy.  Please read all of the instructions 7 days before your colonoscopy.    You will need to take ONLY clear liquids the ENTIRE day before your procedure. These include (clear fruit juices, soda, Gatorade, broth, jello and coffee/tea) Avoid red and purple drinks. No cream or milk in the coffee.  You will need to take a bowel preparation.  You will also need a .      To schedule a procedure please call [phone]    Look into [facility]     Follow up after

## 2024-11-11 NOTE — PROGRESS NOTES
"Subjective   Patient ID: Gretchen Haji is a 44 y.o. female who presents for Diarrhea (Explosive and pain in lower back/).      Chronic diarrhea for 4 years   Has 4+ BMs daily, these are watery and \"explosive\"  She did have a cholecystectomy but this was after     Has back pain at times.  She reports bright red blood (in the water and on the TP), +cramping (generalized-alleviated with a BM), denies nocturnal Bm's    No recent colonoscopy     Rare heartburn, denies dysphagia, odynophagia     CT on 7/2024 was unremarkable     Family Hx: Sister with colitis   Maternal Aunts with uterine cancer @ 32    Denies CRC, GI cancers, celiac     Review of Systems   Constitutional:  Negative for chills, diaphoresis, fatigue and fever.   HENT: Negative.     Eyes: Negative.    Respiratory: Negative.  Negative for apnea, cough, chest tightness, shortness of breath, wheezing and stridor.    Cardiovascular: Negative.    Gastrointestinal:         See HPI    Endocrine: Negative.    Genitourinary: Negative.  Negative for difficulty urinating.   Musculoskeletal: Negative.    Skin: Negative.    Allergic/Immunologic: Negative.    Neurological: Negative.    Hematological: Negative.    Psychiatric/Behavioral: Negative.         Objective   Physical Exam  Constitutional:       Appearance: Normal appearance. She is normal weight.   HENT:      Nose: Nose normal.   Eyes:      General: Lids are normal.   Cardiovascular:      Rate and Rhythm: Normal rate and regular rhythm.      Heart sounds: Normal heart sounds.   Pulmonary:      Effort: Pulmonary effort is normal.      Breath sounds: Normal breath sounds.   Abdominal:      General: Bowel sounds are normal.   Musculoskeletal:         General: Normal range of motion.   Skin:     General: Skin is warm and dry.   Neurological:      Mental Status: She is alert and oriented to person, place, and time.   Psychiatric:         Mood and Affect: Mood normal.         Assessment/Plan   Diagnoses and all " orders for this visit:  Chronic diarrhea  -     Calprotectin, Fecal; Future  -     CBC; Future  -     Comprehensive Metabolic Panel; Future  -     C-Reactive Protein; Future  -     Tissue Transglutaminase IgA; Future       1. Chronic diarrhea -  discussed etiologies such as IBD (sister has UC), bile acid diarrhea, microscopic colitis, celiac,  dietary, IBS, SIBO, she will  complete fecal calprotectin, CRP, TTG, colonoscopy +/-EGD based on results    HEATHER Orellana-CNP 11/11/24 3:12 PM

## 2024-11-12 ENCOUNTER — LAB (OUTPATIENT)
Dept: LAB | Facility: LAB | Age: 44
End: 2024-11-12
Payer: COMMERCIAL

## 2024-11-12 ENCOUNTER — APPOINTMENT (OUTPATIENT)
Dept: OBSTETRICS AND GYNECOLOGY | Facility: CLINIC | Age: 44
End: 2024-11-12
Payer: COMMERCIAL

## 2024-11-12 DIAGNOSIS — K52.9 CHRONIC DIARRHEA: ICD-10-CM

## 2024-11-12 LAB
ALBUMIN SERPL BCP-MCNC: 4.2 G/DL (ref 3.4–5)
ALP SERPL-CCNC: 66 U/L (ref 33–110)
ALT SERPL W P-5'-P-CCNC: 18 U/L (ref 7–45)
ANION GAP SERPL CALC-SCNC: 17 MMOL/L (ref 10–20)
AST SERPL W P-5'-P-CCNC: 15 U/L (ref 9–39)
BILIRUB SERPL-MCNC: 0.3 MG/DL (ref 0–1.2)
BUN SERPL-MCNC: 12 MG/DL (ref 6–23)
CALCIUM SERPL-MCNC: 9.4 MG/DL (ref 8.6–10.6)
CHLORIDE SERPL-SCNC: 98 MMOL/L (ref 98–107)
CO2 SERPL-SCNC: 30 MMOL/L (ref 21–32)
CREAT SERPL-MCNC: 0.68 MG/DL (ref 0.5–1.05)
CRP SERPL-MCNC: 0.45 MG/DL
EGFRCR SERPLBLD CKD-EPI 2021: >90 ML/MIN/1.73M*2
ERYTHROCYTE [DISTWIDTH] IN BLOOD BY AUTOMATED COUNT: 12.8 % (ref 11.5–14.5)
GLUCOSE SERPL-MCNC: 85 MG/DL (ref 74–99)
HCT VFR BLD AUTO: 39.8 % (ref 36–46)
HGB BLD-MCNC: 12.8 G/DL (ref 12–16)
MCH RBC QN AUTO: 30.3 PG (ref 26–34)
MCHC RBC AUTO-ENTMCNC: 32.2 G/DL (ref 32–36)
MCV RBC AUTO: 94 FL (ref 80–100)
NRBC BLD-RTO: 0 /100 WBCS (ref 0–0)
PLATELET # BLD AUTO: 415 X10*3/UL (ref 150–450)
POTASSIUM SERPL-SCNC: 4.8 MMOL/L (ref 3.5–5.3)
PROT SERPL-MCNC: 6.9 G/DL (ref 6.4–8.2)
RBC # BLD AUTO: 4.23 X10*6/UL (ref 4–5.2)
SODIUM SERPL-SCNC: 140 MMOL/L (ref 136–145)
TTG IGA SER IA-ACNC: 3.1 U/ML
WBC # BLD AUTO: 8.8 X10*3/UL (ref 4.4–11.3)

## 2024-11-12 PROCEDURE — 83993 ASSAY FOR CALPROTECTIN FECAL: CPT

## 2024-11-14 LAB — CALPROTECTIN STL-MCNT: 8 UG/G

## 2024-11-18 DIAGNOSIS — K52.9 CHRONIC DIARRHEA: Primary | ICD-10-CM

## 2024-11-22 ENCOUNTER — ANESTHESIA EVENT (OUTPATIENT)
Dept: GASTROENTEROLOGY | Facility: HOSPITAL | Age: 44
End: 2024-11-22
Payer: COMMERCIAL

## 2024-11-22 ENCOUNTER — HOSPITAL ENCOUNTER (OUTPATIENT)
Dept: GASTROENTEROLOGY | Facility: HOSPITAL | Age: 44
Discharge: HOME | End: 2024-11-22
Payer: COMMERCIAL

## 2024-11-22 ENCOUNTER — ANESTHESIA (OUTPATIENT)
Dept: GASTROENTEROLOGY | Facility: HOSPITAL | Age: 44
End: 2024-11-22
Payer: COMMERCIAL

## 2024-11-22 VITALS
BODY MASS INDEX: 33.65 KG/M2 | WEIGHT: 197.09 LBS | DIASTOLIC BLOOD PRESSURE: 68 MMHG | HEART RATE: 66 BPM | SYSTOLIC BLOOD PRESSURE: 128 MMHG | OXYGEN SATURATION: 100 % | TEMPERATURE: 96.8 F | RESPIRATION RATE: 18 BRPM | HEIGHT: 64 IN

## 2024-11-22 DIAGNOSIS — K52.9 CHRONIC DIARRHEA: ICD-10-CM

## 2024-11-22 PROCEDURE — 2500000004 HC RX 250 GENERAL PHARMACY W/ HCPCS (ALT 636 FOR OP/ED): Performed by: ANESTHESIOLOGIST ASSISTANT

## 2024-11-22 PROCEDURE — 3700000001 HC GENERAL ANESTHESIA TIME - INITIAL BASE CHARGE

## 2024-11-22 PROCEDURE — 3700000002 HC GENERAL ANESTHESIA TIME - EACH INCREMENTAL 1 MINUTE

## 2024-11-22 PROCEDURE — 7100000010 HC PHASE TWO TIME - EACH INCREMENTAL 1 MINUTE

## 2024-11-22 PROCEDURE — 7100000009 HC PHASE TWO TIME - INITIAL BASE CHARGE

## 2024-11-22 PROCEDURE — 45380 COLONOSCOPY AND BIOPSY: CPT | Performed by: INTERNAL MEDICINE

## 2024-11-22 PROCEDURE — 45385 COLONOSCOPY W/LESION REMOVAL: CPT | Performed by: INTERNAL MEDICINE

## 2024-11-22 RX ORDER — PROPOFOL 10 MG/ML
INJECTION, EMULSION INTRAVENOUS CONTINUOUS PRN
Status: DISCONTINUED | OUTPATIENT
Start: 2024-11-22 | End: 2024-11-22

## 2024-11-22 SDOH — HEALTH STABILITY: MENTAL HEALTH: CURRENT SMOKER: 0

## 2024-11-22 ASSESSMENT — COLUMBIA-SUICIDE SEVERITY RATING SCALE - C-SSRS
2. HAVE YOU ACTUALLY HAD ANY THOUGHTS OF KILLING YOURSELF?: NO
1. IN THE PAST MONTH, HAVE YOU WISHED YOU WERE DEAD OR WISHED YOU COULD GO TO SLEEP AND NOT WAKE UP?: NO
6. HAVE YOU EVER DONE ANYTHING, STARTED TO DO ANYTHING, OR PREPARED TO DO ANYTHING TO END YOUR LIFE?: NO

## 2024-11-22 ASSESSMENT — PAIN - FUNCTIONAL ASSESSMENT
PAIN_FUNCTIONAL_ASSESSMENT: 0-10

## 2024-11-22 ASSESSMENT — PAIN SCALES - GENERAL
PAINLEVEL_OUTOF10: 0 - NO PAIN
PAIN_LEVEL: 0
PAINLEVEL_OUTOF10: 0 - NO PAIN

## 2024-11-22 NOTE — ANESTHESIA POSTPROCEDURE EVALUATION
Patient: Gretchen Haji    Procedure Summary       Date: 11/22/24 Room / Location: [facility]    Anesthesia Start: 1303 Anesthesia Stop: 1343    Procedure: COLONOSCOPY Diagnosis: Chronic diarrhea    Scheduled Providers: Christiano Chamberlain MD Responsible Provider: Valentin Garcia MD    Anesthesia Type: MAC ASA Status: 2            Anesthesia Type: MAC    Vitals Value Taken Time   /89 11/22/24 1345   Temp 36 °C (96.8 °F) 11/22/24 1342   Pulse 80 11/22/24 1357   Resp 18 11/22/24 1342   SpO2 98 % 11/22/24 1357   Vitals shown include unfiled device data.    Anesthesia Post Evaluation    Patient location during evaluation: PACU  Patient participation: waiting for patient participation  Level of consciousness: awake  Pain score: 0  Pain management: adequate  Airway patency: patent  Cardiovascular status: acceptable  Respiratory status: acceptable and nasal cannula  Hydration status: acceptable  Postoperative Nausea and Vomiting: none        There were no known notable events for this encounter.

## 2024-11-22 NOTE — ANESTHESIA PREPROCEDURE EVALUATION
Patient: Gretchen Haji    Procedure Information       Date/Time: 11/22/24 1330    Scheduled providers: Christiano Chamberlain MD    Procedure: COLONOSCOPY    Location: [facility]            Relevant Problems   Anesthesia (within normal limits)      Neuro   (+) Anxiety      Musculoskeletal   (+) Osteoarthritis       Clinical information reviewed:   Tobacco  Allergies  Meds   Med Hx  Surg Hx  OB Status  Fam Hx  Soc   Hx        NPO Detail:  NPO/Void Status  Carbohydrate Drink Given Prior to Surgery? : N  Date of Last Liquid: 11/22/24  Time of Last Liquid: 0730  Date of Last Solid: 11/21/24  Time of Last Solid: 0830  Last Intake Type: Solid meal  Time of Last Void: 1228         Physical Exam    Airway  Mallampati: III  TM distance: >3 FB  Neck ROM: full     Cardiovascular - normal exam     Dental - normal exam     Pulmonary - normal exam     Abdominal   (+) obese             Anesthesia Plan    History of general anesthesia?: yes  History of complications of general anesthesia?: no    ASA 2     MAC     The patient is not a current smoker.  Patient was previously instructed to abstain from smoking on day of procedure.  Patient did not smoke on day of procedure.    intravenous induction   Anesthetic plan and risks discussed with patient.  Use of blood products discussed with patient who consented to blood products.    Plan discussed with [facility]AA.

## 2024-11-22 NOTE — H&P
Outpatient Hospital Procedure    Patient Profile-Procedures  Initial Info  Patient Demographics  Name Gretchen Haji  Date of Birth 1980  MRN [id number]  Address   [address] [id number] [place] [address]    Primary Phone Number [phone]  Secondary Phone Number    PCP Generic Provider, No Assigned Pcp    Procedures   Colonoscopy with bx    Indication:  Chronic diarrhea    Primary contact name and number   Extended Emergency Contact Information  Primary Emergency Contact: Valentin Knowles  Home Phone: [phone]  Work Phone: [phone]  Mobile Phone: [phone]  Relation: Significant Other   needed? No    General Health  Weight There were no vitals filed for this visit.  BMI There is no height or weight on file to calculate BMI.    Allergies  No Known Allergies    Past Medical History   Past Medical History:   Diagnosis Date    Other conditions influencing health status     No significant past medical history    Personal history of other infectious and parasitic diseases     History of chickenpox       Provider assessment  Diagnosis  Medication Reviewed - yes  Prior to Admission medications    Medication Sig Start Date End Date Taking? Authorizing Provider   clobetasol (Temovate) 0.05 % ointment Apply topically 2 times a day. Taper off to as needed basis 10/15/24 10/15/25 Yes HEATHER Sidhu-ML Puckett, Pen 40 mg/0.4 mL pen injector kit pen-injector Inject 1 Pen (40 mg) under the skin 1 time. 4/30/24  Yes Historical Provider, MD   hyoscyamine (Anaspaz, Levsin) 0.125 mg tablet Take 1 tablet (0.125 mg) by mouth every 6 hours if needed for cramping or diarrhea.  Patient not taking: Reported on 11/22/2024 11/11/24 11/11/25  HEATHER Orellana-CNP       This is my H&P    Physical Exam  Physical Exam  Constitutional:       Appearance: Normal appearance.   HENT:      Head: Normocephalic.      Nose: Nose normal.      Mouth/Throat:      Mouth: Mucous  membranes are moist.   Eyes:      Pupils: Pupils are equal, round, and reactive to light.   Cardiovascular:      Rate and Rhythm: Normal rate and regular rhythm.      Pulses: Normal pulses.      Heart sounds: Normal heart sounds.   Pulmonary:      Effort: Pulmonary effort is normal.      Breath sounds: Normal breath sounds.   Abdominal:      General: Bowel sounds are normal.      Palpations: Abdomen is soft.   Musculoskeletal:      Cervical back: Normal range of motion.   Neurological:      Mental Status: She is alert.         ASA PS Classification 2  Sedation Plan Moderate  Procedure Plan - pre-procedural (re)assesment completed by physician:  discharge/transfer patient when discharge criteria met    Christiano Chamberlain MD  11/22/2024 12:37 PM       none

## 2024-12-03 LAB
LABORATORY COMMENT REPORT: NORMAL
PATH REPORT.FINAL DX SPEC: NORMAL
PATH REPORT.GROSS SPEC: NORMAL
PATH REPORT.RELEVANT HX SPEC: NORMAL
PATH REPORT.TOTAL CANCER: NORMAL

## 2024-12-10 ENCOUNTER — APPOINTMENT (OUTPATIENT)
Dept: PRIMARY CARE | Facility: CLINIC | Age: 44
End: 2024-12-10
Payer: COMMERCIAL

## 2024-12-17 ENCOUNTER — APPOINTMENT (OUTPATIENT)
Dept: PRIMARY CARE | Facility: CLINIC | Age: 44
End: 2024-12-17
Payer: COMMERCIAL

## 2024-12-17 VITALS
SYSTOLIC BLOOD PRESSURE: 122 MMHG | OXYGEN SATURATION: 98 % | TEMPERATURE: 96.8 F | BODY MASS INDEX: 32.92 KG/M2 | HEART RATE: 90 BPM | DIASTOLIC BLOOD PRESSURE: 78 MMHG | WEIGHT: 191.8 LBS

## 2024-12-17 DIAGNOSIS — F32.A ANXIETY AND DEPRESSION: Primary | ICD-10-CM

## 2024-12-17 DIAGNOSIS — F41.9 ANXIETY AND DEPRESSION: Primary | ICD-10-CM

## 2024-12-17 PROCEDURE — 1036F TOBACCO NON-USER: CPT | Performed by: NURSE PRACTITIONER

## 2024-12-17 PROCEDURE — 99214 OFFICE O/P EST MOD 30 MIN: CPT | Performed by: NURSE PRACTITIONER

## 2024-12-17 ASSESSMENT — ANXIETY QUESTIONNAIRES
2. NOT BEING ABLE TO STOP OR CONTROL WORRYING: NEARLY EVERY DAY
IF YOU CHECKED OFF ANY PROBLEMS ON THIS QUESTIONNAIRE, HOW DIFFICULT HAVE THESE PROBLEMS MADE IT FOR YOU TO DO YOUR WORK, TAKE CARE OF THINGS AT HOME, OR GET ALONG WITH OTHER PEOPLE: SOMEWHAT DIFFICULT
7. FEELING AFRAID AS IF SOMETHING AWFUL MIGHT HAPPEN: SEVERAL DAYS
GAD7 TOTAL SCORE: 14
5. BEING SO RESTLESS THAT IT IS HARD TO SIT STILL: NOT AT ALL
1. FEELING NERVOUS, ANXIOUS, OR ON EDGE: MORE THAN HALF THE DAYS
4. TROUBLE RELAXING: MORE THAN HALF THE DAYS
6. BECOMING EASILY ANNOYED OR IRRITABLE: NEARLY EVERY DAY
3. WORRYING TOO MUCH ABOUT DIFFERENT THINGS: NEARLY EVERY DAY

## 2024-12-17 ASSESSMENT — PATIENT HEALTH QUESTIONNAIRE - PHQ9
SUM OF ALL RESPONSES TO PHQ9 QUESTIONS 1 AND 2: 2
3. TROUBLE FALLING OR STAYING ASLEEP OR SLEEPING TOO MUCH: SEVERAL DAYS
2. FEELING DOWN, DEPRESSED OR HOPELESS: MORE THAN HALF THE DAYS
7. TROUBLE CONCENTRATING ON THINGS, SUCH AS READING THE NEWSPAPER OR WATCHING TELEVISION: SEVERAL DAYS
1. LITTLE INTEREST OR PLEASURE IN DOING THINGS: NOT AT ALL
10. IF YOU CHECKED OFF ANY PROBLEMS, HOW DIFFICULT HAVE THESE PROBLEMS MADE IT FOR YOU TO DO YOUR WORK, TAKE CARE OF THINGS AT HOME, OR GET ALONG WITH OTHER PEOPLE: SOMEWHAT DIFFICULT
4. FEELING TIRED OR HAVING LITTLE ENERGY: MORE THAN HALF THE DAYS
SUM OF ALL RESPONSES TO PHQ9 QUESTIONS 1 AND 2: 0
5. POOR APPETITE OR OVEREATING: NEARLY EVERY DAY
8. MOVING OR SPEAKING SO SLOWLY THAT OTHER PEOPLE COULD HAVE NOTICED. OR THE OPPOSITE, BEING SO FIGETY OR RESTLESS THAT YOU HAVE BEEN MOVING AROUND A LOT MORE THAN USUAL: NOT AT ALL
9. THOUGHTS THAT YOU WOULD BE BETTER OFF DEAD, OR OF HURTING YOURSELF: NOT AT ALL
1. LITTLE INTEREST OR PLEASURE IN DOING THINGS: NOT AT ALL
6. FEELING BAD ABOUT YOURSELF - OR THAT YOU ARE A FAILURE OR HAVE LET YOURSELF OR YOUR FAMILY DOWN: SEVERAL DAYS
SUM OF ALL RESPONSES TO PHQ QUESTIONS 1-9: 10
2. FEELING DOWN, DEPRESSED OR HOPELESS: NOT AT ALL

## 2024-12-17 ASSESSMENT — ENCOUNTER SYMPTOMS: DEPRESSION: 0

## 2024-12-17 ASSESSMENT — PAIN SCALES - GENERAL: PAINLEVEL_OUTOF10: 0-NO PAIN

## 2024-12-17 NOTE — PROGRESS NOTES
"Subjective   Patient ID: Gretchen Haji is a 44 y.o. female who presents for Depression and Anxiety.    HPI   Pleasant established 43 y/o female with PMH Arthropathic psoriasis, chronic diarrhea, Fatigue who presents with c/o increasing depression with anxiety    Worsening over the past few months, no exacerbating event. Reports most days feels unmotivated, \"defeated, exhausted\" doesn't want to get out of bed or up from her recliner to use the bathroom. When she has to go anywhere she feels increased anxiety. Sleeps 5-6 hours, has a good support system with her BF  Denies SI, HI, reports she was diagnosed with Bipoalr in the past but did not follow up and was not treated for it  PHQ-9 Score 10, Somewhat  ANNE-7 Score 14 Somewhat  Interested in both Psychiatry and Counseling, .referrals placed and Handouts provided and reviewed with patient, discussed      Review of Systems  Review of Systems   Constitutional: Negative.    Respiratory: Negative.     Cardiovascular: Negative.    Psychiatric/Behavioral: HPI  All other systems reviewed and are negative.    Objective   /78 (BP Location: Left arm, Patient Position: Sitting)   Pulse 90   Temp 36 °C (96.8 °F)   Wt 87 kg (191 lb 12.8 oz)   LMP 11/05/2024 Comment: negative pregnancy test  SpO2 98%   BMI 32.92 kg/m²     Physical Exam  Vitals reviewed.   Constitutional:       Appearance: Normal appearance.   Cardiovascular:      Rate and Rhythm: Normal rate and regular rhythm.   Pulmonary:      Effort: Pulmonary effort is normal.      Breath sounds: Normal breath sounds.   Musculoskeletal:      Cervical back: Normal range of motion and neck supple.   Neurological:      General: No focal deficit present.      Mental Status: She is alert.   Psychiatric:         Mood and Affect: Mood normal. Affect is tearful.         Speech: Speech normal.         Behavior: Behavior normal.         Thought Content: Thought content normal.         Assessment/Plan   Problem List " Items Addressed This Visit             ICD-10-CM    Anxiety and depression - Primary F41.9, F32.A     Virtual resources provided and discussed  Handouts provided and reviewed with patient           Relevant Orders    Referral to Psychiatry    Referral to Psychology

## 2024-12-17 NOTE — PATIENT INSTRUCTIONS
You are not alone  Everyone has struggles in life  Many people are out there to listen, and help, and not  you  Never be afraid to ask    Here is a list of Mental Health Resources;    Suicide and Crisis Hotline  DIAL 988    Mobile Crisis  [phone]   or Text 4HOPE to 714989    Suicide Prevention Lifeline  1-538.739.8242    Recovery Resources   [address]  [phone]  Substance Abuse  Mental Health  Psychiatric Emergency Walk In clinic    [facility]  [address]   [phone]   Substance abuse   Psychiatric Emergency Walk In clinic      Top rated On Line Resources; Just Google the name for the link to follow    Ronn     Cerebral     [employer]    Teen Counseling    Pride Counseling     Depression is Very common, More than 3 million cases are diagnosed per year in    It is a mood disorder that causes persistent feelings of sadness and loss of interest.  It can be caused by genetic, environmental or psychological factors and it can be life long.   Risk of self-harm or suicidal thoughts or actions make major depressive disorder a life-threatening condition.    Symptoms include:   * Feelings of sadness   * Feelings of hopelessness   * Loss of interest/Lack of pleasure   * Short temper   * Irritation   * Tiredness   * Memory Loss   * Sleep disorders   * Crying uncontrollably   * Reduced appetite and weight Loss     Treatments  * Psychotherapy   * Medications can help overcome the condition.   * Psychotherapy  * Brain stimulation therapy  * Transcranial magnetic stimulation (TMS)    Self care   * Control or remove yourself from stressful events   * Avoid alcohol  * Try alternative therapy such as yoga or exercise

## 2024-12-26 ENCOUNTER — HOSPITAL ENCOUNTER (EMERGENCY)
Facility: HOSPITAL | Age: 44
Discharge: HOME | End: 2024-12-26
Attending: STUDENT IN AN ORGANIZED HEALTH CARE EDUCATION/TRAINING PROGRAM
Payer: COMMERCIAL

## 2024-12-26 ENCOUNTER — APPOINTMENT (OUTPATIENT)
Dept: RADIOLOGY | Facility: HOSPITAL | Age: 44
End: 2024-12-26
Payer: COMMERCIAL

## 2024-12-26 VITALS
HEART RATE: 83 BPM | RESPIRATION RATE: 14 BRPM | OXYGEN SATURATION: 99 % | HEIGHT: 64 IN | DIASTOLIC BLOOD PRESSURE: 57 MMHG | TEMPERATURE: 97.7 F | BODY MASS INDEX: 32.44 KG/M2 | WEIGHT: 190 LBS | SYSTOLIC BLOOD PRESSURE: 124 MMHG

## 2024-12-26 DIAGNOSIS — M25.462 EFFUSION OF LEFT KNEE: Primary | ICD-10-CM

## 2024-12-26 PROCEDURE — 73564 X-RAY EXAM KNEE 4 OR MORE: CPT | Mod: LT

## 2024-12-26 PROCEDURE — 73564 X-RAY EXAM KNEE 4 OR MORE: CPT | Mod: LEFT SIDE | Performed by: RADIOLOGY

## 2024-12-26 PROCEDURE — 99283 EMERGENCY DEPT VISIT LOW MDM: CPT | Performed by: STUDENT IN AN ORGANIZED HEALTH CARE EDUCATION/TRAINING PROGRAM

## 2024-12-26 PROCEDURE — 2500000001 HC RX 250 WO HCPCS SELF ADMINISTERED DRUGS (ALT 637 FOR MEDICARE OP): Performed by: STUDENT IN AN ORGANIZED HEALTH CARE EDUCATION/TRAINING PROGRAM

## 2024-12-26 PROCEDURE — 2500000005 HC RX 250 GENERAL PHARMACY W/O HCPCS: Performed by: STUDENT IN AN ORGANIZED HEALTH CARE EDUCATION/TRAINING PROGRAM

## 2024-12-26 RX ORDER — IBUPROFEN 400 MG/1
400 TABLET ORAL ONCE
Status: COMPLETED | OUTPATIENT
Start: 2024-12-26 | End: 2024-12-26

## 2024-12-26 RX ORDER — LIDOCAINE 560 MG/1
1 PATCH PERCUTANEOUS; TOPICAL; TRANSDERMAL DAILY
Qty: 7 PATCH | Refills: 0 | Status: SHIPPED | OUTPATIENT
Start: 2024-12-26

## 2024-12-26 RX ORDER — LIDOCAINE 560 MG/1
1 PATCH PERCUTANEOUS; TOPICAL; TRANSDERMAL ONCE
Status: DISCONTINUED | OUTPATIENT
Start: 2024-12-26 | End: 2024-12-26 | Stop reason: HOSPADM

## 2024-12-26 RX ORDER — NAPROXEN 500 MG/1
500 TABLET ORAL
Qty: 14 TABLET | Refills: 0 | Status: SHIPPED | OUTPATIENT
Start: 2024-12-26 | End: 2025-01-02

## 2024-12-26 RX ADMIN — LIDOCAINE 1 PATCH: 4 PATCH TOPICAL at 18:10

## 2024-12-26 RX ADMIN — IBUPROFEN 400 MG: 400 TABLET ORAL at 18:10

## 2024-12-26 ASSESSMENT — PAIN - FUNCTIONAL ASSESSMENT: PAIN_FUNCTIONAL_ASSESSMENT: 0-10

## 2024-12-26 ASSESSMENT — PAIN DESCRIPTION - LOCATION: LOCATION: KNEE

## 2024-12-26 ASSESSMENT — PAIN DESCRIPTION - PAIN TYPE: TYPE: ACUTE PAIN

## 2024-12-26 ASSESSMENT — PAIN SCALES - GENERAL: PAINLEVEL_OUTOF10: 10 - WORST POSSIBLE PAIN

## 2024-12-26 ASSESSMENT — PAIN DESCRIPTION - DESCRIPTORS: DESCRIPTORS: PRESSURE;TIGHTNESS

## 2024-12-26 ASSESSMENT — PAIN DESCRIPTION - ORIENTATION: ORIENTATION: LEFT

## 2024-12-26 NOTE — DISCHARGE INSTRUCTIONS
You are diagnosed with a knee effusion suspect secondary to osteoarthritis I do recommend rest ice compression elevation Tylenol Motrin alternating as needed for mild to moderate pain lidocaine patches 12 hours on 12 hours off.  Do recommend you follow-up with your primary physician in coming days to ensure improvement and resolution should you have persistent symptoms you can follow-up with surgery as well as provided.  Should you been experiencing pain out of proportion leg swelling pain throughout the calf redness inability to bend the joint symptoms concerning to call 911 or return to the nearest emergency department immediately.

## 2024-12-26 NOTE — ED TRIAGE NOTES
Pt having pain and swelling in her left knee. Has a hx of arthritis in her left knee but doesn't think that is what's causing it. States that it hurts to walk and bend the knee. Started hurting last week. Tried icy hot, heat packs and ice packs along with elevation.

## 2024-12-27 NOTE — ED PROVIDER NOTES
EMERGENCY DEPARTMENT ENCOUNTER      Pt Name: Gretchen Haji  MRN: [id number]  Birthdate 1980  Date of evaluation: 12/26/2024  Provider: Valentin Solo DO    CHIEF COMPLAINT       Chief Complaint   Patient presents with    Leg Swelling     Pt having pain and swelling in her left knee. Has a hx of arthritis in her left knee but doesn't think that is what's causing it. States that it hurts to walk and bend the knee. Started hurting last week. Tried icy hot, heat packs and ice packs along with elevation. Could have happened when she was on a ladder lifting a heavy object.          HISTORY OF PRESENT ILLNESS    Gretchen Haji is a 44 y.o. female who presents to the emergency department with Her self for left knee pain.  Patient does endorse history of arthritis to the left knee.  She is not taking any medications in particular aside from topical IcyHot which offered very little pain relief.  She denies any calf or thigh pain or swelling.  She states the pain and swelling has been localized to the front side of the left knee.  She notes a few days back she was twisting on a ladder and felt an unusual sensation/pop in the left knee.  Since then she has been having the discomfort.  Denies any previous surgeries to the affected extremity.          Nursing Notes were reviewed.    REVIEW OF SYSTEMS     CONSTITUTIONAL: Denies fever, sweats, chills.   NEURO: Denies difficulty walking, numbness, weakness, tingling, headache.   HEENT: Denies sore throat, rhinorrhea, changes in vision.   CARDIO: Denies chest pain, palpitations.  PULM: Denies shortness of breath, cough.   GI: Denies abdominal pain, nausea, vomiting, diarrhea, constipation, melena, hematochezia.  : Denies painful urination, frequency, hematuria.   MSK: Endorses knee pain.  SKIN: Denies rash, lesions.   ENDOCRINE: Denies unexpected weight-loss.   HEME: Denies bleeding disorder.     PAST MEDICAL HISTORY     Past Medical History:   Diagnosis Date     Other conditions influencing health status     No significant past medical history    Personal history of other infectious and parasitic diseases     History of chickenpox       SURGICAL HISTORY       Past Surgical History:   Procedure Laterality Date    TUBAL LIGATION  11/16/2017    Tubal Ligation       ALLERGIES     Patient has no known allergies.    FAMILY HISTORY       Family History   Family history unknown: Yes        SOCIAL HISTORY       Social History     Socioeconomic History    Marital status: Single   Tobacco Use    Smoking status: Former     Types: Cigarettes    Smokeless tobacco: Never   Vaping Use    Vaping status: Never Used   Substance and Sexual Activity    Alcohol use: Never    Drug use: Never     Social Drivers of Health     Financial Resource Strain: Patient Declined (3/13/2023)    Received from [facility]    Overall Financial Resource Strain (CARDIA)     Difficulty of Paying Living Expenses: Patient declined   Food Insecurity: Patient Declined (3/13/2023)    Received from [facility]    Hunger Vital Sign     Worried About Running Out of Food in the Last Year: Patient declined     Ran Out of Food in the Last Year: Patient declined   Transportation Needs: No Transportation Needs (3/13/2023)    Received from [facility]    PRAPARE - Transportation     Lack of Transportation (Medical): No     Lack of Transportation (Non-Medical): No   Physical Activity: Unknown (3/13/2023)    Received from [facility]    Exercise Vital Sign     Days of Exercise per Week: 5 days     Minutes of Exercise per Session: Patient declined   Stress: Stress Concern Present (3/13/2023)    Received from [facility]    [place] [place] [employer] - Occupational Stress Questionnaire     Feeling of Stress : Rather much   Social Connections: Socially Isolated (3/13/2023)    Received from [facility]    Social Connection and Isolation Panel  [NHANES]     Frequency of Communication with Friends and Family: Once a week     Frequency of Social Gatherings with Friends and Family: Never     Attends [employer] Services: Never     Active Member of Clubs or Organizations: No     Attends Club or Organization Meetings: Never     Marital Status: Living with partner   Intimate Partner Violence: Not At Risk (3/13/2023)    Received from [facility]    Humiliation, Afraid, Rape, and Kick questionnaire     Fear of Current or Ex-Partner: No     Emotionally Abused: No     Physically Abused: No     Sexually Abused: No   Housing Stability: Low Risk  (3/13/2023)    Received from [facility]    Housing Stability Vital Sign     Unable to Pay for Housing in the Last Year: No     Number of Places Lived in the Last Year: 1     Unstable Housing in the Last Year: No       PHYSICAL EXAM   VS: As documented in the triage note from today's date and EMR flowsheet were reviewed.  Gen: Well developed. No acute distress. Seated in bed. Appears nontoxic.   Skin: Warm. Dry. Intact. No rashes or lesions.  Eyes: Pupils equally round and reactive to light. Clear sclera.  HENT: Atraumatic appearance. Mucosal membranes moist. No oral lesions, uvula midline, airway patent.   CV: Regular rate and regular rhythm. S1, S2. No pedal edema. Warm extremities.  Resp: Nonlabored breathing Clear to auscultation bilaterally. No increased work of breathing.   GI: Soft and nontender. No rebound or guarding. Bowel sounds x4 present.   MSK: Symmetric muscle bulk. No joint swelling in the extremities. Compartments are soft. Neurovascularly intact x4 extremities. Radial pulses +2 equal bilaterally.  Pedal pulses +2 equal bilaterally.  No pain at the hip ankle toe joints.  Achilles patellar reflexes +2 equal bilaterally.  No pain with passive range of motion in the Left knee.  Neuro: Alert.   speech fluent. Moving all extremities. No focal deficits. Gait normal.  Psych: Appropriate.  "Alexys.    DIAGNOSTIC RESULTS   RADIOLOGY:   Non-plain film images such as CT, Ultrasound and MRI are read by the radiologist. Plain radiographic images are visualized and preliminarily interpreted by the emergency physician with the below findings: X-ray imaging shows no evidence of fracture or dislocation joint effusion present.      Interpretation per the Radiologist below, if available at the time of this note:    XR knee left 4+ views   Final Result   Mild left knee osteoarthritis with a joint effusion.        Signed by: Oscar Zhao 12/26/2024 6:43 PM   Dictation workstation:   [id number]            ED BEDSIDE ULTRASOUND:   Performed by ED Physician - none    LABS:  Labs Reviewed - No data to display    All other labs were within normal range or not returned as of this dictation.    EMERGENCY DEPARTMENT COURSE/MDM:   Vitals:    Vitals:    12/26/24 1714   BP: 124/57   BP Location: Right arm   Patient Position: Sitting   Pulse: 83   Resp: 14   Temp: 36.5 °C (97.7 °F)   TempSrc: Tympanic   SpO2: 99%   Weight: 86.2 kg (190 lb)   Height: 1.626 m (5' 4\")       I reviewed the patient's triage vitals and they are within normal range.    Due to the above findings the following was ordered x-ray imaging of the knee as well as Motrin lidocaine patch for pain.    X-ray imaging of the knee shows joint effusion without evidence of fracture or dislocation.  I do suspect this is likely an aggravation of the osteoarthritis.  No evidence of laxity in the MCL LCL PCL or LCL ligaments.  Low concern for septic arthritis.  Low concern for DVT I did offer the patient ultrasound imaging although she prefers to hold off I feel that this is reasonable.  She is recommended rest ice compression elevation Tylenol Motrin alternating as needed for mild to moderate pain and close follow-up she is appreciative of care agreeable with plan discharged in stable condition.    Diagnoses as of 12/26/24 2341   Effusion of left knee       Patient was " counseled regarding labs, imaging, likely diagnosis, and plan. All questions were answered.     ------------------------------------------------------------------  Information provided by the patient  Past medical history complicating workup osteoarthritis  Previous medical records reviewed office visit 12/17/2024  Considered CT imaging although low concern for occult fracture  Shared medical decision making patient agreeable with close outpatient follow-up and strict return precautions with home-going medications  ------------------------------------------------------------------  ED Medications administered this visit:    Medications   ibuprofen tablet 400 mg (400 mg oral Given 12/26/24 1810)       New Prescriptions from this visit:    Discharge Medication List as of 12/26/2024  6:48 PM        START taking these medications    Details   lidocaine 4 % patch Place 1 patch over 12 hours on the skin once daily. Remove & discard patch within 12 hours or as directed by MD., Starting Thu 12/26/2024, Print      naproxen (Naprosyn) 500 mg tablet Take 1 tablet (500 mg) by mouth 2 times daily (morning and late afternoon) for 7 days., Starting u 12/26/2024, Until u 1/2/2025, Print             Follow-up:  Toshia Vu, APRN-CNP  [address] [place] [address] [phone]    Schedule an appointment as soon as possible for a visit in 2 days      [facility]  [address]  [facility] [address] [phone]  Go to   If symptoms worsen        Final Impression:   1. Effusion of left knee          Valentin Solo DO    (Please note that portions of this note were completed with a voice recognition program.  Efforts were made to edit the dictations but occasionally words are mis-transcribed.)     Valentin Solo DO  12/26/24 [id number]

## 2025-01-21 ENCOUNTER — APPOINTMENT (OUTPATIENT)
Dept: PRIMARY CARE | Facility: CLINIC | Age: 45
End: 2025-01-21
Payer: COMMERCIAL

## 2025-02-04 ENCOUNTER — APPOINTMENT (OUTPATIENT)
Dept: BEHAVIORAL HEALTH | Facility: CLINIC | Age: 45
End: 2025-02-04
Payer: COMMERCIAL

## 2025-02-04 DIAGNOSIS — Z79.899 MEDICATION MANAGEMENT: ICD-10-CM

## 2025-02-04 DIAGNOSIS — F41.1 GAD (GENERALIZED ANXIETY DISORDER): Primary | ICD-10-CM

## 2025-02-04 DIAGNOSIS — F33.1 MODERATE EPISODE OF RECURRENT MAJOR DEPRESSIVE DISORDER: ICD-10-CM

## 2025-02-04 DIAGNOSIS — F43.10 PTSD (POST-TRAUMATIC STRESS DISORDER): ICD-10-CM

## 2025-02-04 PROCEDURE — 99205 OFFICE O/P NEW HI 60 MIN: CPT | Performed by: REGISTERED NURSE

## 2025-02-04 RX ORDER — AMITRIPTYLINE HYDROCHLORIDE 50 MG/1
50 TABLET, FILM COATED ORAL NIGHTLY
Qty: 90 TABLET | Refills: 0 | Status: SHIPPED | OUTPATIENT
Start: 2025-02-04 | End: 2025-05-05

## 2025-02-04 ASSESSMENT — PATIENT HEALTH QUESTIONNAIRE - PHQ9
6. FEELING BAD ABOUT YOURSELF - OR THAT YOU ARE A FAILURE OR HAVE LET YOURSELF OR YOUR FAMILY DOWN: SEVERAL DAYS
1. LITTLE INTEREST OR PLEASURE IN DOING THINGS: NEARLY EVERY DAY
SUM OF ALL RESPONSES TO PHQ9 QUESTIONS 1 & 2: 5
5. POOR APPETITE OR OVEREATING: MORE THAN HALF THE DAYS
SUM OF ALL RESPONSES TO PHQ QUESTIONS 1-9: 12
10. IF YOU CHECKED OFF ANY PROBLEMS, HOW DIFFICULT HAVE THESE PROBLEMS MADE IT FOR YOU TO DO YOUR WORK, TAKE CARE OF THINGS AT HOME, OR GET ALONG WITH OTHER PEOPLE: VERY DIFFICULT
9. THOUGHTS THAT YOU WOULD BE BETTER OFF DEAD, OR OF HURTING YOURSELF: NOT AT ALL
3. TROUBLE FALLING OR STAYING ASLEEP: SEVERAL DAYS
2. FEELING DOWN, DEPRESSED OR HOPELESS: MORE THAN HALF THE DAYS
8. MOVING OR SPEAKING SO SLOWLY THAT OTHER PEOPLE COULD HAVE NOTICED. OR THE OPPOSITE, BEING SO FIGETY OR RESTLESS THAT YOU HAVE BEEN MOVING AROUND A LOT MORE THAN USUAL: NOT AT ALL
7. TROUBLE CONCENTRATING ON THINGS, SUCH AS READING THE NEWSPAPER OR WATCHING TELEVISION: NOT AT ALL
4. FEELING TIRED OR HAVING LITTLE ENERGY: NEARLY EVERY DAY

## 2025-02-04 ASSESSMENT — ANXIETY QUESTIONNAIRES
1. FEELING NERVOUS, ANXIOUS, OR ON EDGE: MORE THAN HALF THE DAYS
4. TROUBLE RELAXING: MORE THAN HALF THE DAYS
5. BEING SO RESTLESS THAT IT IS HARD TO SIT STILL: SEVERAL DAYS
3. WORRYING TOO MUCH ABOUT DIFFERENT THINGS: NEARLY EVERY DAY
GAD7 TOTAL SCORE: 15
7. FEELING AFRAID AS IF SOMETHING AWFUL MIGHT HAPPEN: SEVERAL DAYS
2. NOT BEING ABLE TO STOP OR CONTROL WORRYING: NEARLY EVERY DAY
IF YOU CHECKED OFF ANY PROBLEMS ON THIS QUESTIONNAIRE, HOW DIFFICULT HAVE THESE PROBLEMS MADE IT FOR YOU TO DO YOUR WORK, TAKE CARE OF THINGS AT HOME, OR GET ALONG WITH OTHER PEOPLE: VERY DIFFICULT
6. BECOMING EASILY ANNOYED OR IRRITABLE: NEARLY EVERY DAY
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
## 2025-02-11 ENCOUNTER — APPOINTMENT (OUTPATIENT)
Dept: PRIMARY CARE | Facility: CLINIC | Age: 45
End: 2025-02-11
Payer: COMMERCIAL

## 2025-02-11 VITALS
OXYGEN SATURATION: 97 % | WEIGHT: 199.2 LBS | TEMPERATURE: 96.9 F | SYSTOLIC BLOOD PRESSURE: 122 MMHG | HEART RATE: 88 BPM | BODY MASS INDEX: 34.19 KG/M2 | DIASTOLIC BLOOD PRESSURE: 76 MMHG

## 2025-02-11 DIAGNOSIS — M15.3 OTHER SECONDARY OSTEOARTHRITIS OF MULTIPLE SITES: ICD-10-CM

## 2025-02-11 DIAGNOSIS — F33.1 MODERATE EPISODE OF RECURRENT MAJOR DEPRESSIVE DISORDER: ICD-10-CM

## 2025-02-11 DIAGNOSIS — F41.9 ANXIETY AND DEPRESSION: Primary | ICD-10-CM

## 2025-02-11 DIAGNOSIS — F32.A ANXIETY AND DEPRESSION: Primary | ICD-10-CM

## 2025-02-11 PROCEDURE — 99213 OFFICE O/P EST LOW 20 MIN: CPT | Performed by: NURSE PRACTITIONER

## 2025-02-11 PROCEDURE — 1036F TOBACCO NON-USER: CPT | Performed by: NURSE PRACTITIONER

## 2025-02-11 RX ORDER — MELOXICAM 7.5 MG/1
1 TABLET ORAL NIGHTLY PRN
COMMUNITY
Start: 2025-01-28

## 2025-02-11 ASSESSMENT — ENCOUNTER SYMPTOMS: DEPRESSION: 0

## 2025-02-11 ASSESSMENT — PATIENT HEALTH QUESTIONNAIRE - PHQ9
SUM OF ALL RESPONSES TO PHQ9 QUESTIONS 1 AND 2: 0
1. LITTLE INTEREST OR PLEASURE IN DOING THINGS: NOT AT ALL
2. FEELING DOWN, DEPRESSED OR HOPELESS: NOT AT ALL

## 2025-02-11 ASSESSMENT — PAIN SCALES - GENERAL: PAINLEVEL_OUTOF10: 0-NO PAIN

## 2025-02-11 NOTE — PROGRESS NOTES
Subjective   Patient ID: Gretchen Haji is a 44 y.o. female who presents for Follow-up.    HPI   Pleasant established 45 y/o female with PMH Arthropathic psoriasis, chronic diarrhea, Fatigue who presents for follow up    Anxiety, Depression- recently started amitriptyline, on week 2 feels it is working well for her, did have some nausea/vomit a few days which has resolved. Following with Psychiatry who would like to to pursue trama counseling,  has a good support system with her BF  Denies SI, HI      Review of Systems  Review of Systems   Constitutional: Negative.    Respiratory: Negative.     Cardiovascular: Negative.    Gastrointestinal: Negative.    All other systems reviewed and are negative.    Objective   /76 (BP Location: Left arm, Patient Position: Sitting)   Pulse 88   Temp 36.1 °C (96.9 °F) (Temporal)   Wt 90.4 kg (199 lb 3.2 oz)   SpO2 97%   BMI 34.19 kg/m²     Physical Exam  Physical Exam  Vitals reviewed.   Constitutional:       General: She is active.   HENT:      Head: Normocephalic and atraumatic.   Cardiovascular:      Rate and Rhythm: Normal rate and regular rhythm.   Pulmonary:      Effort: Pulmonary effort is normal.      Breath sounds: Normal breath sounds.   Musculoskeletal:         General: Normal range of motion.      Cervical back: Neck supple.   Neurological:      General: No focal deficit present.      Mental Status: She is alert.     Assessment/Plan   Problem List Items Addressed This Visit             ICD-10-CM    Osteoarthritis M19.90    Anxiety and depression - Primary F41.9, F32.A     Started amitriptyline  Following with Psychiatry who would like to to pursue trama counseling  has a good support system          Moderate episode of recurrent major depressive disorder F33.1

## 2025-02-11 NOTE — ASSESSMENT & PLAN NOTE
Started amitriptyline  Following with Psychiatry who would like to to pursue trama counseling  has a good support system   
[] : Fellow

## 2025-03-25 ENCOUNTER — APPOINTMENT (OUTPATIENT)
Dept: BEHAVIORAL HEALTH | Facility: CLINIC | Age: 45
End: 2025-03-25
Payer: COMMERCIAL

## 2025-03-25 DIAGNOSIS — F41.1 GAD (GENERALIZED ANXIETY DISORDER): ICD-10-CM

## 2025-03-25 DIAGNOSIS — Z79.899 MEDICATION MANAGEMENT: ICD-10-CM

## 2025-03-25 DIAGNOSIS — F43.10 PTSD (POST-TRAUMATIC STRESS DISORDER): ICD-10-CM

## 2025-03-25 DIAGNOSIS — F33.1 MODERATE EPISODE OF RECURRENT MAJOR DEPRESSIVE DISORDER: ICD-10-CM

## 2025-03-25 PROCEDURE — 1036F TOBACCO NON-USER: CPT | Performed by: REGISTERED NURSE

## 2025-03-25 PROCEDURE — 99213 OFFICE O/P EST LOW 20 MIN: CPT | Performed by: REGISTERED NURSE
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
## 2025-05-06 ENCOUNTER — APPOINTMENT (OUTPATIENT)
Dept: BEHAVIORAL HEALTH | Facility: CLINIC | Age: 45
End: 2025-05-06
Payer: COMMERCIAL

## 2025-05-23 ENCOUNTER — APPOINTMENT (OUTPATIENT)
Dept: RADIOLOGY | Facility: HOSPITAL | Age: 45
End: 2025-05-23
Payer: COMMERCIAL

## 2025-05-23 ENCOUNTER — HOSPITAL ENCOUNTER (INPATIENT)
Facility: HOSPITAL | Age: 45
LOS: 2 days | Discharge: HOME | End: 2025-05-25
Attending: EMERGENCY MEDICINE | Admitting: INTERNAL MEDICINE
Payer: COMMERCIAL

## 2025-05-23 DIAGNOSIS — D72.829 LEUKOCYTOSIS, UNSPECIFIED TYPE: ICD-10-CM

## 2025-05-23 DIAGNOSIS — K57.92 ACUTE DIVERTICULITIS: Primary | ICD-10-CM

## 2025-05-23 PROBLEM — R10.9 ABDOMINAL PAIN: Status: ACTIVE | Noted: 2025-05-23

## 2025-05-23 PROBLEM — R00.0 TACHYCARDIA: Status: ACTIVE | Noted: 2025-05-23

## 2025-05-23 LAB
ALBUMIN SERPL BCP-MCNC: 4.4 G/DL (ref 3.4–5)
ALP SERPL-CCNC: 71 U/L (ref 33–110)
ALT SERPL W P-5'-P-CCNC: 16 U/L (ref 7–45)
ANION GAP SERPL CALC-SCNC: 10 MMOL/L (ref 10–20)
APPEARANCE UR: CLEAR
AST SERPL W P-5'-P-CCNC: 13 U/L (ref 9–39)
BASOPHILS # BLD AUTO: 0.07 X10*3/UL (ref 0–0.1)
BASOPHILS NFR BLD AUTO: 0.5 %
BILIRUB SERPL-MCNC: 0.9 MG/DL (ref 0–1.2)
BILIRUB UR STRIP.AUTO-MCNC: NEGATIVE MG/DL
BUN SERPL-MCNC: 9 MG/DL (ref 6–23)
CALCIUM SERPL-MCNC: 9 MG/DL (ref 8.6–10.3)
CHLORIDE SERPL-SCNC: 99 MMOL/L (ref 98–107)
CO2 SERPL-SCNC: 29 MMOL/L (ref 21–32)
COLOR UR: COLORLESS
CREAT SERPL-MCNC: 0.63 MG/DL (ref 0.5–1.05)
EGFRCR SERPLBLD CKD-EPI 2021: >90 ML/MIN/1.73M*2
EOSINOPHIL # BLD AUTO: 0.1 X10*3/UL (ref 0–0.7)
EOSINOPHIL NFR BLD AUTO: 0.7 %
ERYTHROCYTE [DISTWIDTH] IN BLOOD BY AUTOMATED COUNT: 13.2 % (ref 11.5–14.5)
GLUCOSE SERPL-MCNC: 95 MG/DL (ref 74–99)
GLUCOSE UR STRIP.AUTO-MCNC: NORMAL MG/DL
HCT VFR BLD AUTO: 42.1 % (ref 36–46)
HGB BLD-MCNC: 13.4 G/DL (ref 12–16)
IMM GRANULOCYTES # BLD AUTO: 0.06 X10*3/UL (ref 0–0.7)
IMM GRANULOCYTES NFR BLD AUTO: 0.4 % (ref 0–0.9)
KETONES UR STRIP.AUTO-MCNC: NEGATIVE MG/DL
LACTATE SERPL-SCNC: 1.1 MMOL/L (ref 0.4–2)
LEUKOCYTE ESTERASE UR QL STRIP.AUTO: NEGATIVE
LIPASE SERPL-CCNC: 35 U/L (ref 9–82)
LYMPHOCYTES # BLD AUTO: 2.64 X10*3/UL (ref 1.2–4.8)
LYMPHOCYTES NFR BLD AUTO: 18.4 %
MAGNESIUM SERPL-MCNC: 2.22 MG/DL (ref 1.6–2.4)
MCH RBC QN AUTO: 30 PG (ref 26–34)
MCHC RBC AUTO-ENTMCNC: 31.8 G/DL (ref 32–36)
MCV RBC AUTO: 94 FL (ref 80–100)
MONOCYTES # BLD AUTO: 1.15 X10*3/UL (ref 0.1–1)
MONOCYTES NFR BLD AUTO: 8 %
NEUTROPHILS # BLD AUTO: 10.31 X10*3/UL (ref 1.2–7.7)
NEUTROPHILS NFR BLD AUTO: 72 %
NITRITE UR QL STRIP.AUTO: NEGATIVE
NRBC BLD-RTO: 0 /100 WBCS (ref 0–0)
PH UR STRIP.AUTO: 6 [PH]
PLATELET # BLD AUTO: 434 X10*3/UL (ref 150–450)
POTASSIUM SERPL-SCNC: 4 MMOL/L (ref 3.5–5.3)
PROT SERPL-MCNC: 7.9 G/DL (ref 6.4–8.2)
PROT UR STRIP.AUTO-MCNC: NEGATIVE MG/DL
RBC # BLD AUTO: 4.47 X10*6/UL (ref 4–5.2)
RBC # UR STRIP.AUTO: NEGATIVE MG/DL
SODIUM SERPL-SCNC: 134 MMOL/L (ref 136–145)
SP GR UR STRIP.AUTO: 1
UROBILINOGEN UR STRIP.AUTO-MCNC: NORMAL MG/DL
WBC # BLD AUTO: 14.3 X10*3/UL (ref 4.4–11.3)

## 2025-05-23 PROCEDURE — 81003 URINALYSIS AUTO W/O SCOPE: CPT

## 2025-05-23 PROCEDURE — 36415 COLL VENOUS BLD VENIPUNCTURE: CPT

## 2025-05-23 PROCEDURE — 74177 CT ABD & PELVIS W/CONTRAST: CPT

## 2025-05-23 PROCEDURE — 96374 THER/PROPH/DIAG INJ IV PUSH: CPT

## 2025-05-23 PROCEDURE — 99285 EMERGENCY DEPT VISIT HI MDM: CPT | Mod: 25 | Performed by: EMERGENCY MEDICINE

## 2025-05-23 PROCEDURE — 83735 ASSAY OF MAGNESIUM: CPT

## 2025-05-23 PROCEDURE — 80053 COMPREHEN METABOLIC PANEL: CPT

## 2025-05-23 PROCEDURE — 2500000004 HC RX 250 GENERAL PHARMACY W/ HCPCS (ALT 636 FOR OP/ED): Mod: JZ | Performed by: EMERGENCY MEDICINE

## 2025-05-23 PROCEDURE — 2550000001 HC RX 255 CONTRASTS

## 2025-05-23 PROCEDURE — 99223 1ST HOSP IP/OBS HIGH 75: CPT | Performed by: PHYSICIAN ASSISTANT

## 2025-05-23 PROCEDURE — 99222 1ST HOSP IP/OBS MODERATE 55: CPT | Performed by: SURGERY

## 2025-05-23 PROCEDURE — 85025 COMPLETE CBC W/AUTO DIFF WBC: CPT

## 2025-05-23 PROCEDURE — 74177 CT ABD & PELVIS W/CONTRAST: CPT | Performed by: STUDENT IN AN ORGANIZED HEALTH CARE EDUCATION/TRAINING PROGRAM

## 2025-05-23 PROCEDURE — 83605 ASSAY OF LACTIC ACID: CPT | Performed by: PHYSICIAN ASSISTANT

## 2025-05-23 PROCEDURE — 83690 ASSAY OF LIPASE: CPT

## 2025-05-23 PROCEDURE — 99254 IP/OBS CNSLTJ NEW/EST MOD 60: CPT | Performed by: REGISTERED NURSE

## 2025-05-23 PROCEDURE — 2500000004 HC RX 250 GENERAL PHARMACY W/ HCPCS (ALT 636 FOR OP/ED): Mod: JZ | Performed by: PHYSICIAN ASSISTANT

## 2025-05-23 PROCEDURE — 36415 COLL VENOUS BLD VENIPUNCTURE: CPT | Performed by: PHYSICIAN ASSISTANT

## 2025-05-23 PROCEDURE — 1100000001 HC PRIVATE ROOM DAILY

## 2025-05-23 PROCEDURE — 96375 TX/PRO/DX INJ NEW DRUG ADDON: CPT

## 2025-05-23 RX ORDER — MORPHINE SULFATE 2 MG/ML
2 INJECTION, SOLUTION INTRAMUSCULAR; INTRAVENOUS EVERY 4 HOURS PRN
Status: DISCONTINUED | OUTPATIENT
Start: 2025-05-23 | End: 2025-05-25 | Stop reason: HOSPADM

## 2025-05-23 RX ORDER — ACETAMINOPHEN 325 MG/1
650 TABLET ORAL EVERY 4 HOURS PRN
Status: DISCONTINUED | OUTPATIENT
Start: 2025-05-23 | End: 2025-05-25 | Stop reason: HOSPADM

## 2025-05-23 RX ORDER — ACETAMINOPHEN 160 MG/5ML
650 SOLUTION ORAL EVERY 4 HOURS PRN
Status: DISCONTINUED | OUTPATIENT
Start: 2025-05-23 | End: 2025-05-25 | Stop reason: HOSPADM

## 2025-05-23 RX ORDER — SODIUM CHLORIDE 9 MG/ML
100 INJECTION, SOLUTION INTRAVENOUS CONTINUOUS
Status: DISCONTINUED | OUTPATIENT
Start: 2025-05-23 | End: 2025-05-24

## 2025-05-23 RX ORDER — ACETAMINOPHEN 650 MG/1
650 SUPPOSITORY RECTAL EVERY 4 HOURS PRN
Status: DISCONTINUED | OUTPATIENT
Start: 2025-05-23 | End: 2025-05-25 | Stop reason: HOSPADM

## 2025-05-23 RX ORDER — ONDANSETRON HYDROCHLORIDE 2 MG/ML
4 INJECTION, SOLUTION INTRAVENOUS ONCE
Status: COMPLETED | OUTPATIENT
Start: 2025-05-23 | End: 2025-05-23

## 2025-05-23 RX ORDER — ENOXAPARIN SODIUM 100 MG/ML
40 INJECTION SUBCUTANEOUS DAILY
Status: DISCONTINUED | OUTPATIENT
Start: 2025-05-23 | End: 2025-05-25 | Stop reason: HOSPADM

## 2025-05-23 RX ORDER — ONDANSETRON HYDROCHLORIDE 2 MG/ML
4 INJECTION, SOLUTION INTRAVENOUS EVERY 6 HOURS PRN
Status: DISCONTINUED | OUTPATIENT
Start: 2025-05-23 | End: 2025-05-25 | Stop reason: HOSPADM

## 2025-05-23 RX ORDER — ONDANSETRON 4 MG/1
4 TABLET, FILM COATED ORAL EVERY 6 HOURS PRN
Status: DISCONTINUED | OUTPATIENT
Start: 2025-05-23 | End: 2025-05-25 | Stop reason: HOSPADM

## 2025-05-23 RX ORDER — MORPHINE SULFATE 4 MG/ML
4 INJECTION, SOLUTION INTRAMUSCULAR; INTRAVENOUS ONCE
Status: COMPLETED | OUTPATIENT
Start: 2025-05-23 | End: 2025-05-23

## 2025-05-23 RX ORDER — MORPHINE SULFATE 4 MG/ML
4 INJECTION, SOLUTION INTRAMUSCULAR; INTRAVENOUS EVERY 4 HOURS PRN
Status: DISCONTINUED | OUTPATIENT
Start: 2025-05-23 | End: 2025-05-25 | Stop reason: HOSPADM

## 2025-05-23 RX ADMIN — PIPERACILLIN SODIUM AND TAZOBACTAM SODIUM 3.38 G: 3; .375 INJECTION, SOLUTION INTRAVENOUS at 23:18

## 2025-05-23 RX ADMIN — IOHEXOL 75 ML: 350 INJECTION, SOLUTION INTRAVENOUS at 14:44

## 2025-05-23 RX ADMIN — MORPHINE SULFATE 4 MG: 4 INJECTION, SOLUTION INTRAMUSCULAR; INTRAVENOUS at 16:22

## 2025-05-23 RX ADMIN — MORPHINE SULFATE 2 MG: 2 INJECTION, SOLUTION INTRAMUSCULAR; INTRAVENOUS at 20:47

## 2025-05-23 RX ADMIN — SODIUM CHLORIDE 100 ML/HR: 0.9 INJECTION, SOLUTION INTRAVENOUS at 18:21

## 2025-05-23 RX ADMIN — PIPERACILLIN SODIUM AND TAZOBACTAM SODIUM 3.38 G: 3; .375 INJECTION, SOLUTION INTRAVENOUS at 16:58

## 2025-05-23 RX ADMIN — ONDANSETRON 4 MG: 2 INJECTION INTRAMUSCULAR; INTRAVENOUS at 16:22

## 2025-05-23 RX ADMIN — SODIUM CHLORIDE 1000 ML: 9 INJECTION, SOLUTION INTRAVENOUS at 16:58

## 2025-05-23 SDOH — SOCIAL STABILITY: SOCIAL INSECURITY: WERE YOU ABLE TO COMPLETE ALL THE BEHAVIORAL HEALTH SCREENINGS?: YES

## 2025-05-23 SDOH — SOCIAL STABILITY: SOCIAL INSECURITY: WITHIN THE LAST YEAR, HAVE YOU BEEN AFRAID OF YOUR PARTNER OR EX-PARTNER?: NO

## 2025-05-23 SDOH — SOCIAL STABILITY: SOCIAL INSECURITY: DOES ANYONE TRY TO KEEP YOU FROM HAVING/CONTACTING OTHER FRIENDS OR DOING THINGS OUTSIDE YOUR HOME?: NO

## 2025-05-23 SDOH — SOCIAL STABILITY: SOCIAL INSECURITY
WITHIN THE LAST YEAR, HAVE YOU BEEN KICKED, HIT, SLAPPED, OR OTHERWISE PHYSICALLY HURT BY YOUR PARTNER OR EX-PARTNER?: NO

## 2025-05-23 SDOH — ECONOMIC STABILITY: FOOD INSECURITY: WITHIN THE PAST 12 MONTHS, THE FOOD YOU BOUGHT JUST DIDN'T LAST AND YOU DIDN'T HAVE MONEY TO GET MORE.: NEVER TRUE

## 2025-05-23 SDOH — SOCIAL STABILITY: SOCIAL INSECURITY: HAVE YOU HAD ANY THOUGHTS OF HARMING ANYONE ELSE?: NO

## 2025-05-23 SDOH — SOCIAL STABILITY: SOCIAL INSECURITY
WITHIN THE LAST YEAR, HAVE YOU BEEN RAPED OR FORCED TO HAVE ANY KIND OF SEXUAL ACTIVITY BY YOUR PARTNER OR EX-PARTNER?: NO

## 2025-05-23 SDOH — SOCIAL STABILITY: SOCIAL INSECURITY: WITHIN THE LAST YEAR, HAVE YOU BEEN HUMILIATED OR EMOTIONALLY ABUSED IN OTHER WAYS BY YOUR PARTNER OR EX-PARTNER?: NO

## 2025-05-23 SDOH — SOCIAL STABILITY: SOCIAL INSECURITY: ABUSE: ADULT

## 2025-05-23 SDOH — SOCIAL STABILITY: SOCIAL INSECURITY: DO YOU FEEL ANYONE HAS EXPLOITED OR TAKEN ADVANTAGE OF YOU FINANCIALLY OR OF YOUR PERSONAL PROPERTY?: NO

## 2025-05-23 SDOH — SOCIAL STABILITY: SOCIAL INSECURITY: HAVE YOU HAD THOUGHTS OF HARMING ANYONE ELSE?: NO

## 2025-05-23 SDOH — ECONOMIC STABILITY: INCOME INSECURITY: IN THE PAST 12 MONTHS HAS THE ELECTRIC, GAS, OIL, OR WATER COMPANY THREATENED TO SHUT OFF SERVICES IN YOUR HOME?: NO

## 2025-05-23 SDOH — ECONOMIC STABILITY: FOOD INSECURITY: WITHIN THE PAST 12 MONTHS, YOU WORRIED THAT YOUR FOOD WOULD RUN OUT BEFORE YOU GOT THE MONEY TO BUY MORE.: NEVER TRUE

## 2025-05-23 SDOH — SOCIAL STABILITY: SOCIAL INSECURITY: ARE THERE ANY APPARENT SIGNS OF INJURIES/BEHAVIORS THAT COULD BE RELATED TO ABUSE/NEGLECT?: NO

## 2025-05-23 SDOH — SOCIAL STABILITY: SOCIAL INSECURITY: ARE YOU OR HAVE YOU BEEN THREATENED OR ABUSED PHYSICALLY, EMOTIONALLY, OR SEXUALLY BY ANYONE?: NO

## 2025-05-23 SDOH — SOCIAL STABILITY: SOCIAL INSECURITY: DO YOU FEEL UNSAFE GOING BACK TO THE PLACE WHERE YOU ARE LIVING?: NO

## 2025-05-23 SDOH — SOCIAL STABILITY: SOCIAL INSECURITY: HAS ANYONE EVER THREATENED TO HURT YOUR FAMILY OR YOUR PETS?: NO

## 2025-05-23 ASSESSMENT — LIFESTYLE VARIABLES
HOW OFTEN DO YOU HAVE A DRINK CONTAINING ALCOHOL: NEVER
SKIP TO QUESTIONS 9-10: 1
HOW MANY STANDARD DRINKS CONTAINING ALCOHOL DO YOU HAVE ON A TYPICAL DAY: PATIENT DOES NOT DRINK
HOW OFTEN DO YOU HAVE 6 OR MORE DRINKS ON ONE OCCASION: NEVER
AUDIT-C TOTAL SCORE: 0
AUDIT-C TOTAL SCORE: 0

## 2025-05-23 ASSESSMENT — COGNITIVE AND FUNCTIONAL STATUS - GENERAL
DRESSING REGULAR LOWER BODY CLOTHING: A LITTLE
MOBILITY SCORE: 24
DAILY ACTIVITIY SCORE: 23
PATIENT BASELINE BEDBOUND: NO

## 2025-05-23 ASSESSMENT — PATIENT HEALTH QUESTIONNAIRE - PHQ9
2. FEELING DOWN, DEPRESSED OR HOPELESS: NOT AT ALL
SUM OF ALL RESPONSES TO PHQ9 QUESTIONS 1 & 2: 0
1. LITTLE INTEREST OR PLEASURE IN DOING THINGS: NOT AT ALL

## 2025-05-23 ASSESSMENT — ACTIVITIES OF DAILY LIVING (ADL)
LACK_OF_TRANSPORTATION: NO
JUDGMENT_ADEQUATE_SAFELY_COMPLETE_DAILY_ACTIVITIES: YES
FEEDING YOURSELF: INDEPENDENT
WALKS IN HOME: INDEPENDENT
TOILETING: INDEPENDENT
BATHING: INDEPENDENT
HEARING - RIGHT EAR: FUNCTIONAL
HEARING - LEFT EAR: FUNCTIONAL
GROOMING: INDEPENDENT
PATIENT'S MEMORY ADEQUATE TO SAFELY COMPLETE DAILY ACTIVITIES?: YES
DRESSING YOURSELF: INDEPENDENT
ADEQUATE_TO_COMPLETE_ADL: YES

## 2025-05-23 ASSESSMENT — PAIN - FUNCTIONAL ASSESSMENT
PAIN_FUNCTIONAL_ASSESSMENT: 0-10

## 2025-05-23 ASSESSMENT — PAIN DESCRIPTION - DESCRIPTORS
DESCRIPTORS: CRAMPING
DESCRIPTORS: CRAMPING

## 2025-05-23 ASSESSMENT — PAIN SCALES - GENERAL
PAINLEVEL_OUTOF10: 4
PAINLEVEL_OUTOF10: 2
PAINLEVEL_OUTOF10: 0 - NO PAIN

## 2025-05-23 ASSESSMENT — PAIN DESCRIPTION - LOCATION: LOCATION: ABDOMEN

## 2025-05-23 ASSESSMENT — PAIN DESCRIPTION - ORIENTATION: ORIENTATION: LOWER

## 2025-05-23 ASSESSMENT — PAIN DESCRIPTION - PAIN TYPE: TYPE: ACUTE PAIN

## 2025-05-23 NOTE — CONSULTS
Reason For Consult  Diverticulitis    History Of Present Illness  Gretchen Haji is a 45 y.o. female who complains of a 3-day history of worsening left lower quadrant abdominal pain.  She had weakness and chills.  No nausea or vomiting.  She has chronic diarrhea.  She had a colonoscopy in November 2024 which showed diverticulosis and benign polyps.    Past medical history:  Psoriatic arthritis requiring weekly Humira injections.  Last injection was 4 days ago.  Laparoscopic cholecystectomy  Anxiety/depression  PTSD  Tubal ligation    Family history: Sister has ulcerative colitis     Past Medical History  She has a past medical history of Arthritis, Other conditions influencing health status, and Personal history of other infectious and parasitic diseases.    Surgical History  She has a past surgical history that includes Tubal ligation (11/16/2017).     Social History  She reports that she has quit smoking. Her smoking use included cigarettes. She has never used smokeless tobacco. She reports that she does not drink alcohol and does not use drugs.    Family History  Family History[1]     Allergies  Patient has no known allergies.    Review of Systems  As above     Physical Exam  Constitutional: Well-developed, well-nourished, alert and oriented, no acute distress  Skin: Warm and dry, no lesions, no rashes, no jaundice  HEENT: Normocephalic, atraumatic, EOMI, no scleral icterus, eyes have no redness or swelling or discharge, external inspection of ears and nose is normal, mucous membranes moist  Neck: Soft, nontender, no mass or adenopathy  Cardiac: Regular rate and rhythm, no murmur  Chest: Patent airway, clear to auscultation, normal breath sounds with good chest expansion, no wheezes or rales or rhonchi noted, thorax symmetric  Abdomen: Nondistended, positive bowel sounds, soft, moderate left lower quadrant and suprapubic abdominal tenderness, no upper abdominal tenderness, no mass  Rectal: Not  "performed  Extremities: No injury, no lower extremity edema or calf tenderness  Lymphatic: No cervical adenopathy  Musculoskeletal: Range of motion intact, no joint swelling, normal strength  Neurological: Alert and oriented x3, intact sensory and motor function, no obvious focal neurologic abnormalities  Psychological: Appropriate mood and behavior  Examination chaperoned by [facility]     Last Recorded Vitals  Blood pressure 114/64, pulse 96, temperature 36.8 °C (98.2 °F), temperature source Temporal, resp. rate 16, height 1.626 m (5' 4\"), weight 89.4 kg (197 lb), last menstrual period 05/14/2025, SpO2 99%.    Relevant Results  Labs: WBC 14.3, hemoglobin 13.4, platelet 435  Sodium 134, other electrolytes normal, BUN/creatinine normal.    I reviewed the CT abdomen/pelvis report and images:  IMPRESSION:  1.  Acute sigmoid diverticulitis with small contained  microperforation. No evidence of an abscess.     Assessment/Plan     45-year-old female with sigmoid colon diverticulitis with possible small contained microperforation.  On my review of the CT scan the small areas of extraluminal gas may be within sites of diverticulosis.  Recommend admission and initial nonoperative treatment with n.p.o., IV hydration, IV antibiotics.  If the patient develops a complication of the diverticulitis, she could require a Patience procedure.  Hold Humira until her infection has resolved.      Pacheco Garcia MD         [1]   Family History  Family history unknown: Yes     "

## 2025-05-23 NOTE — H&P
History Of Present Illness  Gretchen Haji is a 45 y.o. female with past medical history significant for anxiety and depression, obesity, MDD, PTSD, osteoarthritis, and psoriasis on Humira who presents to the ED with abdominal pain for the past 3 days.  States it is mainly left lower quadrant/left umbilical area and suprapubic area.  Rates it 10/10 in severity at home, says the pain improved in the ER after IV morphine.  Says the pain has been constant since it for started 3 days ago.  States the abdominal pain is worse with bowel movements and urination.  Denies any alleviating factors, states she tried Tylenol and warm compresses at home with no relief.  Reports history of gallbladder removal and hernia repair many years ago.  Denies any falls or injuries.  Denies history of diverticulitis.  Patient initially thought she had a UTI so she went to the urgent care but states her urinalysis was unremarkable aside from hematuria.  Denies any blood in her stools or urine at home.  Denies any nausea or vomiting.  Denies headaches, dizziness, chest pain or shortness of breath, upper respiratory symptoms, or fevers.  Does admit to chills starting earlier this morning.  Denies tobacco use or smoking.  Admits to drinking alcohol very rarely.    ED course: On arrival to the ED, patient afebrile, mildly tachycardic with heart rate 99, otherwise hemodynamically stable with SpO2 100% on room air.  Glucose 95, sodium 134, renal function WNL, LFTs WNL, magnesium WNL.  Lipase 35.  WBC 14.3, hemoglobin/hematocrit WNL, platelets 434.  Urinalysis unremarkable.  CT abdomen/pelvis shows acute sigmoid diverticulitis with small contained microperforation, no evidence of an abscess.    Admitting provider is Dr. Ray     Past Medical History  Medical History[1]    Surgical History  Surgical History[2]     Social History  Denies tobacco use or smoking cigarettes.  Denies illicit drug use.  Admits to alcohol use very rarely.    Family  "History  Family History[3]     Allergies  Patient has no known allergies.    Review of Systems  10 point review of system negative except as noted above in HPI    Physical Exam  Constitutional:       General: She is not in acute distress.     Appearance: Normal appearance. She is not toxic-appearing.   HENT:      Head: Normocephalic and atraumatic.      Mouth/Throat:      Pharynx: Oropharynx is clear.   Eyes:      Conjunctiva/sclera: Conjunctivae normal.   Cardiovascular:      Rate and Rhythm: Normal rate and regular rhythm.      Pulses: Normal pulses.      Heart sounds: Normal heart sounds.   Pulmonary:      Effort: Pulmonary effort is normal. No respiratory distress.      Breath sounds: Normal breath sounds. No wheezing.   Abdominal:      General: Bowel sounds are normal.      Palpations: Abdomen is soft.      Tenderness: There is abdominal tenderness. There is no right CVA tenderness or left CVA tenderness.      Comments: Significant tenderness to palpation suprapubic area and umbilical area, mild tenderness to palpation left lower quadrant.   Musculoskeletal:         General: Normal range of motion.      Right lower leg: No edema.      Left lower leg: No edema.   Skin:     General: Skin is warm and dry.   Neurological:      General: No focal deficit present.      Mental Status: She is alert and oriented to person, place, and time.   Psychiatric:         Mood and Affect: Mood normal.         Behavior: Behavior normal.       Last Recorded Vitals  Blood pressure 114/64, pulse 96, temperature 36.8 °C (98.2 °F), temperature source Temporal, resp. rate 16, height 1.626 m (5' 4\"), weight 89.4 kg (197 lb), last menstrual period 05/14/2025, SpO2 99%.    Relevant Results  Results for orders placed or performed during the hospital encounter of 05/23/25 (from the past 24 hours)   CBC and Auto Differential   Result Value Ref Range    WBC 14.3 (H) 4.4 - 11.3 x10*3/uL    nRBC 0.0 0.0 - 0.0 /100 WBCs    RBC 4.47 4.00 - 5.20 " x10*6/uL    Hemoglobin 13.4 12.0 - 16.0 g/dL    Hematocrit 42.1 36.0 - 46.0 %    MCV 94 80 - 100 fL    MCH 30.0 26.0 - 34.0 pg    MCHC 31.8 (L) 32.0 - 36.0 g/dL    RDW 13.2 11.5 - 14.5 %    Platelets 434 150 - 450 x10*3/uL    Neutrophils % 72.0 40.0 - 80.0 %    Immature Granulocytes %, Automated 0.4 0.0 - 0.9 %    Lymphocytes % 18.4 13.0 - 44.0 %    Monocytes % 8.0 2.0 - 10.0 %    Eosinophils % 0.7 0.0 - 6.0 %    Basophils % 0.5 0.0 - 2.0 %    Neutrophils Absolute 10.31 (H) 1.20 - 7.70 x10*3/uL    Immature Granulocytes Absolute, Automated 0.06 0.00 - 0.70 x10*3/uL    Lymphocytes Absolute 2.64 1.20 - 4.80 x10*3/uL    Monocytes Absolute 1.15 (H) 0.10 - 1.00 x10*3/uL    Eosinophils Absolute 0.10 0.00 - 0.70 x10*3/uL    Basophils Absolute 0.07 0.00 - 0.10 x10*3/uL   Comprehensive metabolic panel   Result Value Ref Range    Glucose 95 74 - 99 mg/dL    Sodium 134 (L) 136 - 145 mmol/L    Potassium 4.0 3.5 - 5.3 mmol/L    Chloride 99 98 - 107 mmol/L    Bicarbonate 29 21 - 32 mmol/L    Anion Gap 10 10 - 20 mmol/L    Urea Nitrogen 9 6 - 23 mg/dL    Creatinine 0.63 0.50 - 1.05 mg/dL    eGFR >90 >60 mL/min/1.73m*2    Calcium 9.0 8.6 - 10.3 mg/dL    Albumin 4.4 3.4 - 5.0 g/dL    Alkaline Phosphatase 71 33 - 110 U/L    Total Protein 7.9 6.4 - 8.2 g/dL    AST 13 9 - 39 U/L    Bilirubin, Total 0.9 0.0 - 1.2 mg/dL    ALT 16 7 - 45 U/L   Lipase   Result Value Ref Range    Lipase 35 9 - 82 U/L   Magnesium   Result Value Ref Range    Magnesium 2.22 1.60 - 2.40 mg/dL   Urinalysis with Reflex Culture and Microscopic   Result Value Ref Range    Color, Urine Colorless (N) Light-Yellow, Yellow, Dark-Yellow    Appearance, Urine Clear Clear    Specific Gravity, Urine 1.004 (N) 1.005 - 1.035    pH, Urine 6.0 5.0, 5.5, 6.0, 6.5, 7.0, 7.5, 8.0    Protein, Urine NEGATIVE NEGATIVE, 10 (TRACE), 20 (TRACE) mg/dL    Glucose, Urine Normal Normal mg/dL    Blood, Urine NEGATIVE NEGATIVE mg/dL    Ketones, Urine NEGATIVE NEGATIVE mg/dL    Bilirubin, Urine  NEGATIVE NEGATIVE mg/dL    Urobilinogen, Urine Normal Normal mg/dL    Nitrite, Urine NEGATIVE NEGATIVE    Leukocyte Esterase, Urine NEGATIVE NEGATIVE         CT abdomen pelvis w IV contrast   Final Result   1.  Acute sigmoid diverticulitis with small contained   microperforation. No evidence of an abscess.             Signed by: Balbir Cote 5/23/2025 3:19 PM   Dictation workstation:   [id number]         Assessment & Plan  Acute diverticulitis    Abdominal pain    Leukocytosis    Tachycardia      Gretchen Haji is a 45 y.o. female with past medical history significant osteoarthritis, and psoriasis on Humira who presents to the ED with abdominal pain for the past 3 days.  States it is mainly left lower quadrant/left umbilical area and suprapubic area.  Rates it 10/10 in severity at home, says the pain improved in the ER after IV morphine. States the abdominal pain is worse with bowel movements and urination.  Denies any alleviating factors, states she tried Tylenol and warm compresses at home with no relief.  Reports history of gallbladder removal and hernia repair many years ago. Denies history of diverticulitis. Denies any blood in her stools or urine at home.     CODE STATUS: Full code    #Acute sigmoid diverticulitis with small microperforation  #LLQ, suprapubic, umbilical abdominal pain  #Leukocytosis, WBC 14.3  #Mildly tachycardic  #Mild hyponatremia, sodium 134  Admit as inpatient  Acute care surgery on consult  Patient afebrile, hemodynamically stable.  Urinalysis unremarkable.  Renal function WNL, lipase WNL.  CT abdomen/pelvis shows acute sigmoid diverticulitis with small contained microperforation, no evidence of an abscess.  Patient given 1 L NS bolus in the ED, will start on continuous IV fluids  IV Zosyn every 6 hours  Lactate added onto lab work  Pain medication, nausea medication as needed  N.p.o. diet, can advance per surgical team  Q 8 vitals  CBC, BMP in the a.m.    Continue home  medications as appropriate    Chronic conditions:  Obesity, anxiety/depression, MDD, PTSD, OA, psoriasis    #DVT prophylaxis  SCDs, ambulation    I spent 60 minutes in the professional and overall care of this patient.    Darian Romeo PA-C         [1]   Past Medical History:  Diagnosis Date    Arthritis     Other conditions influencing health status     No significant past medical history    Personal history of other infectious and parasitic diseases     History of chickenpox   [2]   Past Surgical History:  Procedure Laterality Date    TUBAL LIGATION  11/16/2017    Tubal Ligation   [3]   Family History  Family history unknown: Yes

## 2025-05-23 NOTE — ED TRIAGE NOTES
PT. C/O LOWER ABD. PAIN/CRAMPING, WORSE WHEN HAS TO URINATE. PT. STATES THOUGHT HAD A UTI SO WENT TO URGENT CARE BUT WAS TOLD DID NOT HAVE UTI BUT DOES HAVE BLOOD IN URINE. DENIES CHANGES TO BOWEL MOVEMENTS. PT. ALSO C/O PAIN TO LOWER BACK, STATES ACHY ALL OVER. LMP 5/14, STATES NOT CURRENTLY SEXUALLY ACTIVE. DENIES VAGINAL BLEEDING OR DISCHARGE.

## 2025-05-23 NOTE — ED TRIAGE NOTES
The patient was seen and examined in triage.    History of Present Illness: The patient is a 45-year-old female presenting to the emergency department due to abdominal pain.  Patient reports bilateral lower abdominal pain with associated urinary symptoms over the past 2-3 days. Her pain radiates into her back and is exacerbated when urinating.  Endorses dysuria as well as urinary urgency and frequency.  Denies any hematuria, vaginal bleeding, or abnormal vaginal discharge.  She is not currently sexually active. No reported fevers, chills, nausea, vomiting, diarrhea, or constipation.      Brief Physical Exam:  Exam is limited by the patient sitting in a chair in triage.   Heart: Regular rate and rhythm.   Lungs: Clear to auscultation bilaterally.   Abdomen: Soft, nondistended, bilateral lower quadrant tenderness to palpation. CVA tenderness present bilaterally.    Plan: Appropriate labs and diagnostic imaging were ordered.      For the remainder of the patient's workup and ED course, please refer to the main ED provider note. We discussed need for diagnostic testing including laboratory studies and imaging.  We also discussed that they may be asked to wait in the waiting room while these tests are pending.  They understand that if they choose to leave without having the testing completed or resulted that we cannot rule out acute life threatening illnesses and the risks involved could lead to worsening condition, permanent disability or even death.      Disclaimer: This note was dictated by speech recognition. Minor errors in transcription may be present. Please call if questions.

## 2025-05-23 NOTE — ED PROVIDER NOTES
HPI   Chief Complaint   Patient presents with    Abdominal Pain     PT. C/O LOWER ABD. PAIN/CRAMPING, WORSE WHEN HAS TO URINATE. PT. STATES THOUGHT HAD A UTI SO WENT TO URGENT CARE BUT WAS TOLD DID NOT HAVE UTI BUT DOES HAVE BLOOD IN URINE. DENIES CHANGES TO BOWEL MOVEMENTS. PT. ALSO C/O PAIN TO LOWER BACK, STATES ACHY ALL OVER. LMP 5/14, STATES NOT CURRENTLY SEXUALLY ACTIVE. DENIES VAGINAL BLEEDING OR DISCHARGE.       45-year-old female PMH of arthritis and psoriasis on Humira presenting with left lower quadrant/left umbilical pain.  Patient states that she started having this about 3 days ago.  She denies fevers but is having chills, body aches.  Patient has 10/10 pain in her lower abdomen.  She states it is worse with bowel movements.  She states she also has worsening pain when she pees her bladder.  She denies dysuria, hematuria, frequency.  She states that               Patient History   Medical History[1]  Surgical History[2]  Family History[3]  Social History[4]    Physical Exam   ED Triage Vitals [05/23/25 1128]   Temperature Heart Rate Respirations BP   36.8 °C (98.2 °F) 99 16 125/70      Pulse Ox Temp Source Heart Rate Source Patient Position   100 % Temporal Monitor Sitting      BP Location FiO2 (%)     Right arm --       Physical Exam  Constitutional:       General: She is not in acute distress.  HENT:      Head: Normocephalic and atraumatic.      Mouth/Throat:      Mouth: Mucous membranes are moist.   Eyes:      Extraocular Movements: Extraocular movements intact.      Pupils: Pupils are equal, round, and reactive to light.   Cardiovascular:      Rate and Rhythm: Normal rate and regular rhythm.   Abdominal:      Palpations: Abdomen is soft.      Tenderness: There is abdominal tenderness in the periumbilical area, suprapubic area and left lower quadrant.   Skin:     General: Skin is warm and dry.      Capillary Refill: Capillary refill takes less than 2 seconds.   Neurological:      General: No focal  deficit present.      Mental Status: She is alert.   Psychiatric:         Mood and Affect: Mood normal.           ED Course & MDM   Diagnoses as of 05/23/25 1645   Acute diverticulitis   Leukocytosis, unspecified type                 No data recorded     Marita Coma Scale Score: 15 (05/23/25 1600 : Rose Marie Urena, REG[facility])                           Medical Decision Making  Patient presenting with abdominal pain.  Patient was seen and evaluated in triage and had lab work and imaging ordered before my assessment.  Differential includes but is not limited to  UTI, pyelonephritis, ovarian cyst, ovarian torsion, pregnancy, ectopic pregnancy, kidney stone.  CBC will be obtained to assess white blood cell count, hemoglobin, platelets.  CMP to assess liver function, renal function, electrolytes, glucose.    Urinalysis to assess for UTI.  CT of the abdomen pelvis with IV contrast was obtained.  Patient was given morphine, Zofran, IV fluids to the IV.  CBC shows leukocytosis of 14.5 with stable hemoglobin and platelets.  Renal function and electrolytes within normal limits.  Urinalysis negative for infection.  CT of the abdomen pelvis shows acute sigmoid diverticulitis with small contained microperforation.  No evidence of abscess.  Given this patient was given IV Zosyn.  Will discuss with hospitalist and general surgery for admission.        Procedure  Procedures         [1]   Past Medical History:  Diagnosis Date    Arthritis     Other conditions influencing health status     No significant past medical history    Personal history of other infectious and parasitic diseases     History of chickenpox   [2]   Past Surgical History:  Procedure Laterality Date    TUBAL LIGATION  11/16/2017    Tubal Ligation   [3]   Family History  Family history unknown: Yes   [4]   Social History  Tobacco Use    Smoking status: Former     Types: Cigarettes    Smokeless tobacco: Never   Vaping Use    Vaping status: Never Used   Substance Use  Topics    Alcohol use: Never    Drug use: Never        Nikita Saxena, DO  05/23/25 1287

## 2025-05-23 NOTE — PROGRESS NOTES
Pharmacy Medication History Review    Gretchen Haji is a 45 y.o. female admitted for Acute diverticulitis. Pharmacy reviewed the patient's [id number] medications and allergies for accuracy.    The list below reflectives the updated PTA list. Please review each medication in order reconciliation for additional clarification and justification.  Prior to Admission medications    Medication Sig Start Date End Date Authorizing Provider   ML Riojas, Pen 40 mg/0.4 mL pen injector kit pen-injector Inject 1 Pen (40 mg) under the skin 1 (one) time per week.   Historical Provider, MD   hyoscyamine (Anaspaz, Levsin) 0.125 mg tablet Take 1 tablet (0.125 mg) by mouth every 6 hours if needed for cramping or diarrhea.   HEATHER Orellana-CNP   meloxicam (Mobic) 7.5 mg tablet Take 1 tablet (7.5 mg) by mouth as needed at bedtime for pain.   Historical Provider, MD        The list below reflectives the updated allergy list. Please review each documented allergy for additional clarification and justification.  Allergies  Reviewed by Joya Kinney RN on 5/23/2025   No Known Allergies         Below are additional concerns with the patient's PTA list.      Aileen Mantilla

## 2025-05-23 NOTE — CONSULTS
Reason For Consult  Acute diverticulitis with microperforation     History Of Present Illness  Gretchen Haji is a 45 y.o. female presenting with 3 days of progressively worsening crampy abdominal pain. Pain started 3 days ago, but 2 days ago she developed LE weakness and back pain. Yesterday she developed chills, but is uncertain if she had a fever. Finally came in today when pain was too severe to tolerate. Tried taking OTC pain relievers at home with no relief.  Denied any nausea or vomiting. Was eating like normal, but did not have anything to eat today.  Endorses chronic diarrhea. She says this happened after having gallbladder removed; however, per chart review, seems as if she has had chronic diarrhea with workup for quite some time. She has a sister with UC.  Has had colonoscopy in past, most recent being Nov 2024 which showed normal TI, diverticulosis of sigmoid colon, and 2 polyps in rectum- removed.     Patient has never had pain like this before. Has never had diverticulitis. Rated pain as 10/10 at its worst. Has received Morphine in ED and currently rates pain as 2/10; however, was quite tender on examination.     Labs significant for Leukocytosis of 14. CT A/P identified acute sigmoid diverticulitis with microperforation.   Received IV Morphine, IV Zofran, 1L NS, and IV Zosyn in ED.    Her history includes anxiety/depression, PTSD, psoriatic arthritis (on weekly Humira injections; last injection Monday 5/19), lap rbian, and tubal ligation.     PCP: Toshia Vu  GI: Has recently seen Sirena Weir (APRN) and had colonoscopy performed by Dr. Chamberlain  Rheumatologist: [facility] provider      Past Medical History  She has a past medical history of Arthritis, Other conditions influencing health status, and Personal history of other infectious and parasitic diseases.    Surgical History  She has a past surgical history that includes Tubal ligation (11/16/2017).  Removal of bone spurs. Colonoscopy.  "Laparoscopic cholecystectomy      Social History  She reports that she has quit smoking. Her smoking use included cigarettes. She has never used smokeless tobacco. She reports that she does not drink alcohol and does not use drugs.   Quit smoking in 2012. Was previously heavy smoker.    Family History  Family History[1]     Allergies  Patient has no known allergies.    Review of Systems  As noted above:   Abdominal pain, chills, chronic diarrhea, pain in abdomen with urination- but no true dysuria. No hematuria.    + Blood in stool with history of hemorrhoids- blood on toilet paper when wiping.     Physical Exam:  Constitutional:        Adult female, appears stated age, resting on cart in ED hallway. Quite flushed, tearful, and ill-appearing. Significant other at bedside        HENT:     MMM  Eyes:      conjunctival injection bilaterally  Cardiovascular:      2+ pulses, regular rate  Pulmonary:     Comfortable WOB  Abdominal:      Abdomen round, non distended, very tender to palpation entire lower abdomen up to umbilicus     Skin:      Flushed appearance   Extremities:     No edema in BLE  Neurological:      A&O x3  Psychiatric:        Anxious        Last Recorded Vitals  Blood pressure 114/64, pulse 96, temperature 36.8 °C (98.2 °F), temperature source Temporal, resp. rate 16, height 1.626 m (5' 4\"), weight 89.4 kg (197 lb), last menstrual period 05/14/2025, SpO2 99%.    Relevant Results  Results for orders placed or performed during the hospital encounter of 05/23/25 (from the past 24 hours)   CBC and Auto Differential   Result Value Ref Range    WBC 14.3 (H) 4.4 - 11.3 x10*3/uL    nRBC 0.0 0.0 - 0.0 /100 WBCs    RBC 4.47 4.00 - 5.20 x10*6/uL    Hemoglobin 13.4 12.0 - 16.0 g/dL    Hematocrit 42.1 36.0 - 46.0 %    MCV 94 80 - 100 fL    MCH 30.0 26.0 - 34.0 pg    MCHC 31.8 (L) 32.0 - 36.0 g/dL    RDW 13.2 11.5 - 14.5 %    Platelets 434 150 - 450 x10*3/uL    Neutrophils % 72.0 40.0 - 80.0 %    Immature Granulocytes " %, Automated 0.4 0.0 - 0.9 %    Lymphocytes % 18.4 13.0 - 44.0 %    Monocytes % 8.0 2.0 - 10.0 %    Eosinophils % 0.7 0.0 - 6.0 %    Basophils % 0.5 0.0 - 2.0 %    Neutrophils Absolute 10.31 (H) 1.20 - 7.70 x10*3/uL    Immature Granulocytes Absolute, Automated 0.06 0.00 - 0.70 x10*3/uL    Lymphocytes Absolute 2.64 1.20 - 4.80 x10*3/uL    Monocytes Absolute 1.15 (H) 0.10 - 1.00 x10*3/uL    Eosinophils Absolute 0.10 0.00 - 0.70 x10*3/uL    Basophils Absolute 0.07 0.00 - 0.10 x10*3/uL   Comprehensive metabolic panel   Result Value Ref Range    Glucose 95 74 - 99 mg/dL    Sodium 134 (L) 136 - 145 mmol/L    Potassium 4.0 3.5 - 5.3 mmol/L    Chloride 99 98 - 107 mmol/L    Bicarbonate 29 21 - 32 mmol/L    Anion Gap 10 10 - 20 mmol/L    Urea Nitrogen 9 6 - 23 mg/dL    Creatinine 0.63 0.50 - 1.05 mg/dL    eGFR >90 >60 mL/min/1.73m*2    Calcium 9.0 8.6 - 10.3 mg/dL    Albumin 4.4 3.4 - 5.0 g/dL    Alkaline Phosphatase 71 33 - 110 U/L    Total Protein 7.9 6.4 - 8.2 g/dL    AST 13 9 - 39 U/L    Bilirubin, Total 0.9 0.0 - 1.2 mg/dL    ALT 16 7 - 45 U/L   Lipase   Result Value Ref Range    Lipase 35 9 - 82 U/L   Magnesium   Result Value Ref Range    Magnesium 2.22 1.60 - 2.40 mg/dL   Urinalysis with Reflex Culture and Microscopic   Result Value Ref Range    Color, Urine Colorless (N) Light-Yellow, Yellow, Dark-Yellow    Appearance, Urine Clear Clear    Specific Gravity, Urine 1.004 (N) 1.005 - 1.035    pH, Urine 6.0 5.0, 5.5, 6.0, 6.5, 7.0, 7.5, 8.0    Protein, Urine NEGATIVE NEGATIVE, 10 (TRACE), 20 (TRACE) mg/dL    Glucose, Urine Normal Normal mg/dL    Blood, Urine NEGATIVE NEGATIVE mg/dL    Ketones, Urine NEGATIVE NEGATIVE mg/dL    Bilirubin, Urine NEGATIVE NEGATIVE mg/dL    Urobilinogen, Urine Normal Normal mg/dL    Nitrite, Urine NEGATIVE NEGATIVE    Leukocyte Esterase, Urine NEGATIVE NEGATIVE          Assessment/Plan     45 year old female with a history of chronic diarrhea, anxiety/depression, PTSD, psoriatic arthritis on  Humira, and diverticulosis presented to [facility] ED on 5/23 with 3 day history of crampy abdominal pain that progressively worsened. She also developed weakness and chills. Diagnosed with acute sigmoid diverticulitis with microperf. Admitted to medicine with surgery on consult.    Impression:  Acute complicated sigmoid diverticulitis (microperforation without abscess)  Leukocytosis 2/2 above  Acute abdominal pain    Recommendations:  - no acute surgical intervention at this time  - NPO except meds/few ice chips only  - IVF  - continue IV Zosyn  - PRN IV analgesia/antiemetics per primary team  - will follow abdominal exam closely   - okay for DVT chemoprophylaxis  - would HOLD home Humira. Will need to weigh risk/benefit of restarting in outpatient setting/determine optimal time to resume. Last dose 5/19  - remainder of care per medicine team; okay for other home PO meds    The patient will be seen and discussed with the attending surgeon Dr. Garcia      I spent 30 minutes in the professional and overall care of this patient.      My Tidwell, APRN-CNP         [1]   Family History  Family history unknown: Yes

## 2025-05-24 LAB
ANION GAP SERPL CALC-SCNC: 11 MMOL/L (ref 10–20)
BUN SERPL-MCNC: 7 MG/DL (ref 6–23)
CALCIUM SERPL-MCNC: 8 MG/DL (ref 8.6–10.3)
CHLORIDE SERPL-SCNC: 104 MMOL/L (ref 98–107)
CO2 SERPL-SCNC: 28 MMOL/L (ref 21–32)
CREAT SERPL-MCNC: 0.64 MG/DL (ref 0.5–1.05)
EGFRCR SERPLBLD CKD-EPI 2021: >90 ML/MIN/1.73M*2
ERYTHROCYTE [DISTWIDTH] IN BLOOD BY AUTOMATED COUNT: 13.3 % (ref 11.5–14.5)
GLUCOSE SERPL-MCNC: 99 MG/DL (ref 74–99)
HCT VFR BLD AUTO: 34.9 % (ref 36–46)
HGB BLD-MCNC: 11 G/DL (ref 12–16)
HOLD SPECIMEN: NORMAL
MCH RBC QN AUTO: 30 PG (ref 26–34)
MCHC RBC AUTO-ENTMCNC: 31.5 G/DL (ref 32–36)
MCV RBC AUTO: 95 FL (ref 80–100)
NRBC BLD-RTO: 0 /100 WBCS (ref 0–0)
PLATELET # BLD AUTO: 317 X10*3/UL (ref 150–450)
POTASSIUM SERPL-SCNC: 3.8 MMOL/L (ref 3.5–5.3)
RBC # BLD AUTO: 3.67 X10*6/UL (ref 4–5.2)
SODIUM SERPL-SCNC: 139 MMOL/L (ref 136–145)
WBC # BLD AUTO: 9.9 X10*3/UL (ref 4.4–11.3)

## 2025-05-24 PROCEDURE — 2500000004 HC RX 250 GENERAL PHARMACY W/ HCPCS (ALT 636 FOR OP/ED): Mod: JZ | Performed by: PHYSICIAN ASSISTANT

## 2025-05-24 PROCEDURE — 99231 SBSQ HOSP IP/OBS SF/LOW 25: CPT | Performed by: REGISTERED NURSE

## 2025-05-24 PROCEDURE — 36415 COLL VENOUS BLD VENIPUNCTURE: CPT | Performed by: PHYSICIAN ASSISTANT

## 2025-05-24 PROCEDURE — 1100000001 HC PRIVATE ROOM DAILY

## 2025-05-24 PROCEDURE — 99232 SBSQ HOSP IP/OBS MODERATE 35: CPT | Performed by: SURGERY

## 2025-05-24 PROCEDURE — 80048 BASIC METABOLIC PNL TOTAL CA: CPT | Performed by: PHYSICIAN ASSISTANT

## 2025-05-24 PROCEDURE — 2500000001 HC RX 250 WO HCPCS SELF ADMINISTERED DRUGS (ALT 637 FOR MEDICARE OP)

## 2025-05-24 PROCEDURE — 85027 COMPLETE CBC AUTOMATED: CPT | Performed by: PHYSICIAN ASSISTANT

## 2025-05-24 PROCEDURE — 2500000004 HC RX 250 GENERAL PHARMACY W/ HCPCS (ALT 636 FOR OP/ED): Mod: JZ | Performed by: REGISTERED NURSE

## 2025-05-24 RX ORDER — ACETAMINOPHEN 500 MG
5 TABLET ORAL NIGHTLY PRN
Status: DISCONTINUED | OUTPATIENT
Start: 2025-05-24 | End: 2025-05-25 | Stop reason: HOSPADM

## 2025-05-24 RX ORDER — DEXTROSE MONOHYDRATE AND SODIUM CHLORIDE 5; .9 G/100ML; G/100ML
75 INJECTION, SOLUTION INTRAVENOUS CONTINUOUS
Status: DISCONTINUED | OUTPATIENT
Start: 2025-05-24 | End: 2025-05-25

## 2025-05-24 RX ADMIN — Medication 5 MG: at 22:18

## 2025-05-24 RX ADMIN — PIPERACILLIN SODIUM AND TAZOBACTAM SODIUM 3.38 G: 3; .375 INJECTION, SOLUTION INTRAVENOUS at 04:43

## 2025-05-24 RX ADMIN — DEXTROSE AND SODIUM CHLORIDE 100 ML/HR: 5; .9 INJECTION, SOLUTION INTRAVENOUS at 07:02

## 2025-05-24 RX ADMIN — MORPHINE SULFATE 2 MG: 2 INJECTION, SOLUTION INTRAMUSCULAR; INTRAVENOUS at 10:24

## 2025-05-24 RX ADMIN — PIPERACILLIN SODIUM AND TAZOBACTAM SODIUM 3.38 G: 3; .375 INJECTION, SOLUTION INTRAVENOUS at 10:22

## 2025-05-24 RX ADMIN — MORPHINE SULFATE 2 MG: 2 INJECTION, SOLUTION INTRAMUSCULAR; INTRAVENOUS at 03:06

## 2025-05-24 RX ADMIN — SODIUM CHLORIDE 100 ML/HR: 0.9 INJECTION, SOLUTION INTRAVENOUS at 03:07

## 2025-05-24 RX ADMIN — PIPERACILLIN SODIUM AND TAZOBACTAM SODIUM 3.38 G: 3; .375 INJECTION, SOLUTION INTRAVENOUS at 23:54

## 2025-05-24 RX ADMIN — PIPERACILLIN SODIUM AND TAZOBACTAM SODIUM 3.38 G: 3; .375 INJECTION, SOLUTION INTRAVENOUS at 16:55

## 2025-05-24 RX ADMIN — DEXTROSE AND SODIUM CHLORIDE 100 ML/HR: 5; .9 INJECTION, SOLUTION INTRAVENOUS at 21:16

## 2025-05-24 ASSESSMENT — PAIN SCALES - GENERAL
PAINLEVEL_OUTOF10: 0 - NO PAIN
PAINLEVEL_OUTOF10: 2
PAINLEVEL_OUTOF10: 2
PAINLEVEL_OUTOF10: 6
PAINLEVEL_OUTOF10: 5 - MODERATE PAIN

## 2025-05-24 ASSESSMENT — COGNITIVE AND FUNCTIONAL STATUS - GENERAL
DAILY ACTIVITIY SCORE: 23
MOBILITY SCORE: 24
DRESSING REGULAR LOWER BODY CLOTHING: A LITTLE

## 2025-05-24 ASSESSMENT — PAIN DESCRIPTION - LOCATION: LOCATION: BACK

## 2025-05-24 ASSESSMENT — PAIN - FUNCTIONAL ASSESSMENT
PAIN_FUNCTIONAL_ASSESSMENT: 0-10

## 2025-05-24 NOTE — CARE PLAN
The patient's goals for the shift include      The clinical goals for the shift include patient to remain HDS throughout shift    Problem: Pain - Adult  Goal: Verbalizes/displays adequate comfort level or baseline comfort level  Outcome: Progressing     Problem: Safety - Adult  Goal: Free from fall injury  Outcome: Progressing     Problem: Discharge Planning  Goal: Discharge to home or other facility with appropriate resources  Outcome: Progressing     Problem: Chronic Conditions and Co-morbidities  Goal: Patient's chronic conditions and co-morbidity symptoms are monitored and maintained or improved  Outcome: Progressing     Problem: Nutrition  Goal: Nutrient intake appropriate for maintaining nutritional needs  Outcome: Progressing     Problem: Pain  Goal: Takes deep breaths with improved pain control throughout the shift  Outcome: Progressing  Goal: Turns in bed with improved pain control throughout the shift  Outcome: Progressing  Goal: Walks with improved pain control throughout the shift  Outcome: Progressing  Goal: Performs ADL's with improved pain control throughout shift  Outcome: Progressing  Goal: Participates in PT with improved pain control throughout the shift  Outcome: Progressing  Goal: Free from opioid side effects throughout the shift  Outcome: Progressing  Goal: Free from acute confusion related to pain meds throughout the shift  Outcome: Progressing

## 2025-05-24 NOTE — PROGRESS NOTES
"Gretchen Haji is a 45 y.o. female on day 1 of admission presenting with Acute diverticulitis.    Subjective   Patient states she does feel better today. Denies nausea or vomiting. Denies fever or chills. Still with mild abdominal pain. Passing gas. No BM       Objective     Physical Exam  Vitals reviewed.   Constitutional:       General: She is not in acute distress.     Appearance: She is not ill-appearing.   Cardiovascular:      Rate and Rhythm: Normal rate and regular rhythm.      Pulses: Normal pulses.   Pulmonary:      Effort: Pulmonary effort is normal. No respiratory distress.      Breath sounds: Normal breath sounds.   Abdominal:      General: Bowel sounds are normal. There is no distension.      Palpations: Abdomen is soft.      Tenderness: There is abdominal tenderness (Mild, LLQ - much improved).   Skin:     General: Skin is warm.      Coloration: Skin is not jaundiced.      Comments: Less flushed today   Neurological:      Mental Status: She is alert and oriented to person, place, and time.   Psychiatric:         Mood and Affect: Mood normal.         Behavior: Behavior normal.         Last Recorded Vitals  Blood pressure 101/55, pulse 86, temperature 37.6 °C (99.7 °F), temperature source Temporal, resp. rate 18, height 1.626 m (5' 4\"), weight 89.4 kg (197 lb), last menstrual period 05/14/2025, SpO2 99%.  Intake/Output last 3 Shifts:  I/O last 3 completed shifts:  In: 150 (1.7 mL/kg) [IV Piggyback:150]  Out: - (0 mL/kg)   Weight: 89.4 kg     Relevant Results  Results for orders placed or performed during the hospital encounter of 05/23/25 (from the past 24 hours)   CBC and Auto Differential   Result Value Ref Range    WBC 14.3 (H) 4.4 - 11.3 x10*3/uL    nRBC 0.0 0.0 - 0.0 /100 WBCs    RBC 4.47 4.00 - 5.20 x10*6/uL    Hemoglobin 13.4 12.0 - 16.0 g/dL    Hematocrit 42.1 36.0 - 46.0 %    MCV 94 80 - 100 fL    MCH 30.0 26.0 - 34.0 pg    MCHC 31.8 (L) 32.0 - 36.0 g/dL    RDW 13.2 11.5 - 14.5 %    " Platelets 434 150 - 450 x10*3/uL    Neutrophils % 72.0 40.0 - 80.0 %    Immature Granulocytes %, Automated 0.4 0.0 - 0.9 %    Lymphocytes % 18.4 13.0 - 44.0 %    Monocytes % 8.0 2.0 - 10.0 %    Eosinophils % 0.7 0.0 - 6.0 %    Basophils % 0.5 0.0 - 2.0 %    Neutrophils Absolute 10.31 (H) 1.20 - 7.70 x10*3/uL    Immature Granulocytes Absolute, Automated 0.06 0.00 - 0.70 x10*3/uL    Lymphocytes Absolute 2.64 1.20 - 4.80 x10*3/uL    Monocytes Absolute 1.15 (H) 0.10 - 1.00 x10*3/uL    Eosinophils Absolute 0.10 0.00 - 0.70 x10*3/uL    Basophils Absolute 0.07 0.00 - 0.10 x10*3/uL   Comprehensive metabolic panel   Result Value Ref Range    Glucose 95 74 - 99 mg/dL    Sodium 134 (L) 136 - 145 mmol/L    Potassium 4.0 3.5 - 5.3 mmol/L    Chloride 99 98 - 107 mmol/L    Bicarbonate 29 21 - 32 mmol/L    Anion Gap 10 10 - 20 mmol/L    Urea Nitrogen 9 6 - 23 mg/dL    Creatinine 0.63 0.50 - 1.05 mg/dL    eGFR >90 >60 mL/min/1.73m*2    Calcium 9.0 8.6 - 10.3 mg/dL    Albumin 4.4 3.4 - 5.0 g/dL    Alkaline Phosphatase 71 33 - 110 U/L    Total Protein 7.9 6.4 - 8.2 g/dL    AST 13 9 - 39 U/L    Bilirubin, Total 0.9 0.0 - 1.2 mg/dL    ALT 16 7 - 45 U/L   Lipase   Result Value Ref Range    Lipase 35 9 - 82 U/L   Magnesium   Result Value Ref Range    Magnesium 2.22 1.60 - 2.40 mg/dL   Urinalysis with Reflex Culture and Microscopic   Result Value Ref Range    Color, Urine Colorless (N) Light-Yellow, Yellow, Dark-Yellow    Appearance, Urine Clear Clear    Specific Gravity, Urine 1.004 (N) 1.005 - 1.035    pH, Urine 6.0 5.0, 5.5, 6.0, 6.5, 7.0, 7.5, 8.0    Protein, Urine NEGATIVE NEGATIVE, 10 (TRACE), 20 (TRACE) mg/dL    Glucose, Urine Normal Normal mg/dL    Blood, Urine NEGATIVE NEGATIVE mg/dL    Ketones, Urine NEGATIVE NEGATIVE mg/dL    Bilirubin, Urine NEGATIVE NEGATIVE mg/dL    Urobilinogen, Urine Normal Normal mg/dL    Nitrite, Urine NEGATIVE NEGATIVE    Leukocyte Esterase, Urine NEGATIVE NEGATIVE   Extra Urine Gray Tube   Result Value  Ref Range    Extra Tube Hold for add-ons.    Lactate   Result Value Ref Range    Lactate 1.1 0.4 - 2.0 mmol/L   CBC   Result Value Ref Range    WBC 9.9 4.4 - 11.3 x10*3/uL    nRBC 0.0 0.0 - 0.0 /100 WBCs    RBC 3.67 (L) 4.00 - 5.20 x10*6/uL    Hemoglobin 11.0 (L) 12.0 - 16.0 g/dL    Hematocrit 34.9 (L) 36.0 - 46.0 %    MCV 95 80 - 100 fL    MCH 30.0 26.0 - 34.0 pg    MCHC 31.5 (L) 32.0 - 36.0 g/dL    RDW 13.3 11.5 - 14.5 %    Platelets 317 150 - 450 x10*3/uL   Basic metabolic panel   Result Value Ref Range    Glucose 99 74 - 99 mg/dL    Sodium 139 136 - 145 mmol/L    Potassium 3.8 3.5 - 5.3 mmol/L    Chloride 104 98 - 107 mmol/L    Bicarbonate 28 21 - 32 mmol/L    Anion Gap 11 10 - 20 mmol/L    Urea Nitrogen 7 6 - 23 mg/dL    Creatinine 0.64 0.50 - 1.05 mg/dL    eGFR >90 >60 mL/min/1.73m*2    Calcium 8.0 (L) 8.6 - 10.3 mg/dL           Assessment & Plan  Acute diverticulitis    Abdominal pain    Leukocytosis    Tachycardia    45 year old female with a history of chronic diarrhea, anxiety/depression, PTSD, psoriatic arthritis on Humira, and diverticulosis presented to [facility] ED on 5/23 with 3 day history of crampy abdominal pain that progressively worsened. She also developed weakness and chills. Diagnosed with acute sigmoid diverticulitis with microperf. Admitted to medicine with surgery on consult.     Impression:  Acute complicated sigmoid diverticulitis (microperforation without abscess)  Leukocytosis 2/2 above - resolved  Acute abdominal pain - improved      Recommendations:  - no acute surgical intervention at this time  - CLD today  - keep IVF  - continue IV Zosyn  - PRN IV analgesia/antiemetics per primary team  - will follow abdominal exam closely   - okay for DVT chemoprophylaxis  - would HOLD home Humira. Will need to weigh risk/benefit of restarting in outpatient setting/determine optimal time to resume. Last dose 5/19  - remainder of care per medicine team; okay for other home PO meds     The patient  was seen and discussed with the attending surgeon Dr. Garcia     I spent 15 minutes in the professional and overall care of this patient.      My Tidwell, HEATHER-CNP

## 2025-05-24 NOTE — H&P
History Of Present Illness  Gretchen Haji is a 45 y.o. female with past medical history significant for anxiety and depression, obesity, MDD, PTSD, osteoarthritis, and psoriasis on Humira who presents to the ED with abdominal pain for the past 3 days.  States it is mainly left lower quadrant/left umbilical area and suprapubic area.  Rates it 10/10 in severity at home, says the pain improved in the ER after IV morphine.  Says the pain has been constant since it for started 3 days ago.  States the abdominal pain is worse with bowel movements and urination.  Denies any alleviating factors, states she tried Tylenol and warm compresses at home with no relief.  Reports history of gallbladder removal and hernia repair many years ago.  Denies any falls or injuries.  Denies history of diverticulitis.  Patient initially thought she had a UTI so she went to the urgent care but states her urinalysis was unremarkable aside from hematuria.  Denies any blood in her stools or urine at home.  Denies any nausea or vomiting.  Denies headaches, dizziness, chest pain or shortness of breath, upper respiratory symptoms, or fevers.  Does admit to chills starting earlier this morning.  Denies tobacco use or smoking.  Admits to drinking alcohol very rarely.    ED course: On arrival to the ED, patient afebrile, mildly tachycardic with heart rate 99, otherwise hemodynamically stable with SpO2 100% on room air.  Glucose 95, sodium 134, renal function WNL, LFTs WNL, magnesium WNL.  Lipase 35.  WBC 14.3, hemoglobin/hematocrit WNL, platelets 434.  Urinalysis unremarkable.  CT abdomen/pelvis shows acute sigmoid diverticulitis with small contained microperforation, no evidence of an abscess.       Past Medical History  Medical History[1]    Surgical History  Surgical History[2]     Social History  Denies tobacco use or smoking cigarettes.  Denies illicit drug use.  Admits to alcohol use very rarely.    Family History  Family History[3]    "  Allergies  Patient has no known allergies.    Review of Systems  10 point review of system negative except as noted above in HPI    Physical Exam  Constitutional:       General: She is not in acute distress.     Appearance: Normal appearance. She is not toxic-appearing.   HENT:      Head: Normocephalic and atraumatic.      Mouth/Throat:      Pharynx: Oropharynx is clear.   Eyes:      Conjunctiva/sclera: Conjunctivae normal.   Cardiovascular:      Rate and Rhythm: Normal rate and regular rhythm.      Pulses: Normal pulses.      Heart sounds: Normal heart sounds.   Pulmonary:      Effort: Pulmonary effort is normal. No respiratory distress.      Breath sounds: Normal breath sounds. No wheezing.   Abdominal:      General: Bowel sounds are normal.      Palpations: Abdomen is soft.      Tenderness: There is abdominal tenderness. There is no right CVA tenderness or left CVA tenderness.      Comments: Significant tenderness to palpation suprapubic area and umbilical area, mild tenderness to palpation left lower quadrant.   Musculoskeletal:         General: Normal range of motion.      Right lower leg: No edema.      Left lower leg: No edema.   Skin:     General: Skin is warm and dry.   Neurological:      General: No focal deficit present.      Mental Status: She is alert and oriented to person, place, and time.   Psychiatric:         Mood and Affect: Mood normal.         Behavior: Behavior normal.       Last Recorded Vitals  Blood pressure 101/55, pulse 86, temperature 37.6 °C (99.7 °F), temperature source Temporal, resp. rate 18, height 1.626 m (5' 4\"), weight 89.4 kg (197 lb), last menstrual period 05/14/2025, SpO2 99%.    Relevant Results  Results for orders placed or performed during the hospital encounter of 05/23/25 (from the past 24 hours)   Lactate   Result Value Ref Range    Lactate 1.1 0.4 - 2.0 mmol/L   CBC   Result Value Ref Range    WBC 9.9 4.4 - 11.3 x10*3/uL    nRBC 0.0 0.0 - 0.0 /100 WBCs    RBC 3.67 (L) " 4.00 - 5.20 x10*6/uL    Hemoglobin 11.0 (L) 12.0 - 16.0 g/dL    Hematocrit 34.9 (L) 36.0 - 46.0 %    MCV 95 80 - 100 fL    MCH 30.0 26.0 - 34.0 pg    MCHC 31.5 (L) 32.0 - 36.0 g/dL    RDW 13.3 11.5 - 14.5 %    Platelets 317 150 - 450 x10*3/uL   Basic metabolic panel   Result Value Ref Range    Glucose 99 74 - 99 mg/dL    Sodium 139 136 - 145 mmol/L    Potassium 3.8 3.5 - 5.3 mmol/L    Chloride 104 98 - 107 mmol/L    Bicarbonate 28 21 - 32 mmol/L    Anion Gap 11 10 - 20 mmol/L    Urea Nitrogen 7 6 - 23 mg/dL    Creatinine 0.64 0.50 - 1.05 mg/dL    eGFR >90 >60 mL/min/1.73m*2    Calcium 8.0 (L) 8.6 - 10.3 mg/dL         CT abdomen pelvis w IV contrast   Final Result   1.  Acute sigmoid diverticulitis with small contained   microperforation. No evidence of an abscess.             Signed by: Balbir Cote 5/23/2025 3:19 PM   Dictation workstation:   [id number]         Assessment & Plan  Acute diverticulitis    Abdominal pain    Leukocytosis    Tachycardia      Gretchen Haji is a 45 y.o. female with past medical history significant osteoarthritis, and psoriasis on Humira who presents to the ED with abdominal pain for the past 3 days.  States it is mainly left lower quadrant/left umbilical area and suprapubic area.  Rates it 10/10 in severity at home, says the pain improved in the ER after IV morphine. States the abdominal pain is worse with bowel movements and urination.  Denies any alleviating factors, states she tried Tylenol and warm compresses at home with no relief.  Reports history of gallbladder removal and hernia repair many years ago. Denies history of diverticulitis. Denies any blood in her stools or urine at home.     CODE STATUS: Full code    #Acute sigmoid diverticulitis with small microperforation  #LLQ, suprapubic, umbilical abdominal pain  #Leukocytosis, WBC 14.3  #Mildly tachycardic  #Mild hyponatremia, sodium 134  Admit as inpatient  Acute care surgery on consult  Patient afebrile,  hemodynamically stable.  Urinalysis unremarkable.  Renal function WNL, lipase WNL.  CT abdomen/pelvis shows acute sigmoid diverticulitis with small contained microperforation, no evidence of an abscess.  Patient given 1 L NS bolus in the ED, will start on continuous IV fluids  IV Zosyn every 6 hours  Lactate added onto lab work  Pain medication, nausea medication as needed  N.p.o. diet, can advance per surgical team  Q 8 vitals  CBC, BMP in the a.m.    Continue home medications as appropriate    Chronic conditions:  Obesity, anxiety/depression, MDD, PTSD, OA, psoriasis    #DVT prophylaxis  SCDs, ambulation        Patient fully evaluated 05/24  ,thorough record review performed of previous labs and notes from prior encounters. Plan discussed with interdisciplinary team, consults placed, appreciate input. Will continue current and repeat labs in the AM.        Discharge planning discussed with patient and care team. Therapy evaluations ordered. Patient aware and agreeable to current plan, continue plan as above.     I spent a total of 75 minutes on the date of the service which included preparing to see the patient, face-to-face patient care, completing clinical documentation, obtaining and/or reviewing separately obtained history, performing a medically appropriate examination, counseling and educating the patient/family/caregiver, ordering medications, tests, or procedures, communicating with other HCPs (not separately reported), independently interpreting results (not separately reported), communicating results to the patient/family/caregiver, and care coordination (not separately reported).       Neyda Coughlin         [1]   Past Medical History:  Diagnosis Date    Arthritis     Other conditions influencing health status     No significant past medical history    Personal history of other infectious and parasitic diseases     History of chickenpox   [2]   Past Surgical History:  Procedure Laterality Date    TUBAL  LIGATION  11/16/2017    Tubal Ligation   [3]   Family History  Family history unknown: Yes

## 2025-05-24 NOTE — PROGRESS NOTES
"General surgery attending note:  I examined and evaluated the patient.  I discussed the patient with the KINZA.    Subjective   Abdominal pain much improved.  No nausea.  Passing gas.  No bowel movement.    Objective     Physical Exam  Well-developed, well-nourished, no acute distress, alert and oriented  Abdomen: Nondistended, soft, mild left lower quadrant and suprapubic tenderness, the rest of the abdomen is nontender, no mass  Extremities: No lower extremity edema or calf tenderness    Last Recorded Vitals  Blood pressure 101/55, pulse 86, temperature 37.6 °C (99.7 °F), temperature source Temporal, resp. rate 18, height 1.626 m (5' 4\"), weight 89.4 kg (197 lb), last menstrual period 05/14/2025, SpO2 99%.  Intake/Output last 3 Shifts:  I/O last 3 completed shifts:  In: 150 (1.7 mL/kg) [IV Piggyback:150]  Out: - (0 mL/kg)   Weight: 89.4 kg     Relevant Results  Labs: WBC 9.9, hemoglobin 11.0, lactate normal    Assessment & Plan  Acute diverticulitis    Abdominal pain    Leukocytosis    Tachycardia    45-year-old female with sigmoid colon diverticulitis.  Clinically much improved from admission.  Start clear liquid diet.  Continue Zosyn.      Pacheco Garcia MD    "

## 2025-05-25 VITALS
WEIGHT: 197 LBS | BODY MASS INDEX: 33.63 KG/M2 | OXYGEN SATURATION: 96 % | RESPIRATION RATE: 18 BRPM | DIASTOLIC BLOOD PRESSURE: 59 MMHG | TEMPERATURE: 97.7 F | HEART RATE: 76 BPM | HEIGHT: 64 IN | SYSTOLIC BLOOD PRESSURE: 101 MMHG

## 2025-05-25 LAB
ALBUMIN SERPL BCP-MCNC: 3.4 G/DL (ref 3.4–5)
ALP SERPL-CCNC: 64 U/L (ref 33–110)
ALT SERPL W P-5'-P-CCNC: 30 U/L (ref 7–45)
ANION GAP SERPL CALC-SCNC: 11 MMOL/L (ref 10–20)
AST SERPL W P-5'-P-CCNC: 16 U/L (ref 9–39)
BASOPHILS # BLD AUTO: 0.06 X10*3/UL (ref 0–0.1)
BASOPHILS NFR BLD AUTO: 0.8 %
BILIRUB SERPL-MCNC: 0.4 MG/DL (ref 0–1.2)
BUN SERPL-MCNC: 5 MG/DL (ref 6–23)
CALCIUM SERPL-MCNC: 8 MG/DL (ref 8.6–10.3)
CHLORIDE SERPL-SCNC: 105 MMOL/L (ref 98–107)
CO2 SERPL-SCNC: 28 MMOL/L (ref 21–32)
CREAT SERPL-MCNC: 0.51 MG/DL (ref 0.5–1.05)
EGFRCR SERPLBLD CKD-EPI 2021: >90 ML/MIN/1.73M*2
EOSINOPHIL # BLD AUTO: 0.16 X10*3/UL (ref 0–0.7)
EOSINOPHIL NFR BLD AUTO: 2.3 %
ERYTHROCYTE [DISTWIDTH] IN BLOOD BY AUTOMATED COUNT: 13.3 % (ref 11.5–14.5)
GLUCOSE SERPL-MCNC: 105 MG/DL (ref 74–99)
HCT VFR BLD AUTO: 35.3 % (ref 36–46)
HGB BLD-MCNC: 11.4 G/DL (ref 12–16)
IMM GRANULOCYTES # BLD AUTO: 0.02 X10*3/UL (ref 0–0.7)
IMM GRANULOCYTES NFR BLD AUTO: 0.3 % (ref 0–0.9)
LYMPHOCYTES # BLD AUTO: 2.06 X10*3/UL (ref 1.2–4.8)
LYMPHOCYTES NFR BLD AUTO: 29 %
MCH RBC QN AUTO: 30.6 PG (ref 26–34)
MCHC RBC AUTO-ENTMCNC: 32.3 G/DL (ref 32–36)
MCV RBC AUTO: 95 FL (ref 80–100)
MONOCYTES # BLD AUTO: 0.6 X10*3/UL (ref 0.1–1)
MONOCYTES NFR BLD AUTO: 8.5 %
NEUTROPHILS # BLD AUTO: 4.2 X10*3/UL (ref 1.2–7.7)
NEUTROPHILS NFR BLD AUTO: 59.1 %
NRBC BLD-RTO: 0 /100 WBCS (ref 0–0)
PLATELET # BLD AUTO: 333 X10*3/UL (ref 150–450)
POTASSIUM SERPL-SCNC: 3.5 MMOL/L (ref 3.5–5.3)
PROT SERPL-MCNC: 6.1 G/DL (ref 6.4–8.2)
RBC # BLD AUTO: 3.73 X10*6/UL (ref 4–5.2)
SODIUM SERPL-SCNC: 140 MMOL/L (ref 136–145)
WBC # BLD AUTO: 7.1 X10*3/UL (ref 4.4–11.3)

## 2025-05-25 PROCEDURE — 2500000001 HC RX 250 WO HCPCS SELF ADMINISTERED DRUGS (ALT 637 FOR MEDICARE OP): Performed by: PHYSICIAN ASSISTANT

## 2025-05-25 PROCEDURE — 2500000004 HC RX 250 GENERAL PHARMACY W/ HCPCS (ALT 636 FOR OP/ED): Mod: JZ | Performed by: PHYSICIAN ASSISTANT

## 2025-05-25 PROCEDURE — 36415 COLL VENOUS BLD VENIPUNCTURE: CPT | Performed by: INTERNAL MEDICINE

## 2025-05-25 PROCEDURE — 84075 ASSAY ALKALINE PHOSPHATASE: CPT | Performed by: INTERNAL MEDICINE

## 2025-05-25 PROCEDURE — 99231 SBSQ HOSP IP/OBS SF/LOW 25: CPT | Performed by: REGISTERED NURSE

## 2025-05-25 PROCEDURE — 85025 COMPLETE CBC W/AUTO DIFF WBC: CPT | Performed by: INTERNAL MEDICINE

## 2025-05-25 PROCEDURE — 99231 SBSQ HOSP IP/OBS SF/LOW 25: CPT | Performed by: SURGERY

## 2025-05-25 RX ORDER — ACETAMINOPHEN 325 MG/1
650 TABLET ORAL EVERY 4 HOURS PRN
Qty: 30 TABLET | Refills: 0 | Status: SHIPPED | OUTPATIENT
Start: 2025-05-25

## 2025-05-25 RX ORDER — AMOXICILLIN AND CLAVULANATE POTASSIUM 875; 125 MG/1; MG/1
1 TABLET, FILM COATED ORAL 2 TIMES DAILY
Qty: 20 TABLET | Refills: 0 | Status: SHIPPED | OUTPATIENT
Start: 2025-05-25

## 2025-05-25 RX ORDER — AMOXICILLIN AND CLAVULANATE POTASSIUM 875; 125 MG/1; MG/1
1 TABLET, FILM COATED ORAL 2 TIMES DAILY
Qty: 20 TABLET | Refills: 0 | Status: SHIPPED | OUTPATIENT
Start: 2025-05-25 | End: 2025-05-25

## 2025-05-25 RX ORDER — ACETAMINOPHEN 325 MG/1
650 TABLET ORAL EVERY 4 HOURS PRN
Qty: 30 TABLET | Refills: 0 | Status: SHIPPED | OUTPATIENT
Start: 2025-05-25 | End: 2025-05-25

## 2025-05-25 RX ADMIN — ACETAMINOPHEN 650 MG: 325 TABLET ORAL at 10:23

## 2025-05-25 RX ADMIN — PIPERACILLIN SODIUM AND TAZOBACTAM SODIUM 3.38 G: 3; .375 INJECTION, SOLUTION INTRAVENOUS at 05:45

## 2025-05-25 RX ADMIN — PIPERACILLIN SODIUM AND TAZOBACTAM SODIUM 3.38 G: 3; .375 INJECTION, SOLUTION INTRAVENOUS at 10:23

## 2025-05-25 ASSESSMENT — PAIN SCALES - GENERAL: PAINLEVEL_OUTOF10: 0 - NO PAIN

## 2025-05-25 ASSESSMENT — COGNITIVE AND FUNCTIONAL STATUS - GENERAL
DAILY ACTIVITIY SCORE: 23
MOBILITY SCORE: 24
DRESSING REGULAR LOWER BODY CLOTHING: A LITTLE

## 2025-05-25 NOTE — DISCHARGE SUMMARY
Discharge Diagnosis  Acute diverticulitis     Issues Requiring Follow-Up  05/25- patient with no new complaints, no acute events overnight, diet advanced to full liquids, tolerating at time of exam. Will advance diet per general surgery. BSx4, hyperactive, states she has chronic diarrhea, episode today. Will deescalate IV antibiotics - zosyn to oral, probiotics added, will  repeat labs in the AM if still hospitalized.      Discharge Meds     Medication List      ASK your doctor about these medications     amitriptyline 50 mg tablet; Commonly known as: Elavil; Take 1 tablet (50   mg) by mouth once daily at bedtime.   clobetasol 0.05 % ointment; Commonly known as: Temovate; Apply topically   2 times a day. Taper off to as needed basis   Humira(CF) Pen 40 mg/0.4 mL pen injector kit pen-injector; Generic drug:   adalimumab   hyoscyamine 0.125 mg tablet; Commonly known as: Anaspaz, Levsin; Take 1   tablet (0.125 mg) by mouth every 6 hours if needed for cramping or   diarrhea.   lidocaine 4 % patch; Place 1 patch over 12 hours on the skin once daily.   Remove & discard patch within 12 hours or as directed by MD.   meloxicam 7.5 mg tablet; Commonly known as: Mobic       Test Results Pending At Discharge  Pending Labs       No current pending labs.            Hospital Course           Cory Carrasquillo MD   Physician  Internal Medicine     H&P     Signed     Date of Service: 5/24/2025  2:14 PM     Signed         History Of Present Illness  Gretchen Haji is a 45 y.o. female with past medical history significant for anxiety and depression, obesity, MDD, PTSD, osteoarthritis, and psoriasis on Humira who presents to the ED with abdominal pain for the past 3 days.  States it is mainly left lower quadrant/left umbilical area and suprapubic area.  Rates it 10/10 in severity at home, says the pain improved in the ER after IV morphine.  Says the pain has been constant since it for started 3 days ago.  States the abdominal pain  is worse with bowel movements and urination.  Denies any alleviating factors, states she tried Tylenol and warm compresses at home with no relief.  Reports history of gallbladder removal and hernia repair many years ago.  Denies any falls or injuries.  Denies history of diverticulitis.  Patient initially thought she had a UTI so she went to the urgent care but states her urinalysis was unremarkable aside from hematuria.  Denies any blood in her stools or urine at home.  Denies any nausea or vomiting.  Denies headaches, dizziness, chest pain or shortness of breath, upper respiratory symptoms, or fevers.  Does admit to chills starting earlier this morning.  Denies tobacco use or smoking.  Admits to drinking alcohol very rarely.     ED course: On arrival to the ED, patient afebrile, mildly tachycardic with heart rate 99, otherwise hemodynamically stable with SpO2 100% on room air.  Glucose 95, sodium 134, renal function WNL, LFTs WNL, magnesium WNL.  Lipase 35.  WBC 14.3, hemoglobin/hematocrit WNL, platelets 434.  Urinalysis unremarkable.  CT abdomen/pelvis shows acute sigmoid diverticulitis with small contained microperforation, no evidence of an abscess.        Past Medical History  [Medical History]    [Medical History]  Past Medical History       Diagnosis Date    Arthritis      Other conditions influencing health status       No significant past medical history    Personal history of other infectious and parasitic diseases       History of chickenpox        Surgical History  [Surgical History]    [Surgical History]  Past Surgical History        Procedure Laterality Date    TUBAL LIGATION   11/16/2017     Tubal Ligation        Social History  Denies tobacco use or smoking cigarettes.  Denies illicit drug use.  Admits to alcohol use very rarely.     Family History  [Family History]    [Family History]  Family history unknown: Yes        Allergies  Patient has no known allergies.     Review of Systems  10 point  "review of system negative except as noted above in HPI     Physical Exam  Constitutional:       General: She is not in acute distress.     Appearance: Normal appearance. She is not toxic-appearing.   HENT:      Head: Normocephalic and atraumatic.      Mouth/Throat:      Pharynx: Oropharynx is clear.   Eyes:      Conjunctiva/sclera: Conjunctivae normal.   Cardiovascular:      Rate and Rhythm: Normal rate and regular rhythm.      Pulses: Normal pulses.      Heart sounds: Normal heart sounds.   Pulmonary:      Effort: Pulmonary effort is normal. No respiratory distress.      Breath sounds: Normal breath sounds. No wheezing.   Abdominal:      General: Bowel sounds are normal.      Palpations: Abdomen is soft.      Tenderness: There is abdominal tenderness. There is no right CVA tenderness or left CVA tenderness.      Comments: Significant tenderness to palpation suprapubic area and umbilical area, mild tenderness to palpation left lower quadrant.   Musculoskeletal:         General: Normal range of motion.      Right lower leg: No edema.      Left lower leg: No edema.   Skin:     General: Skin is warm and dry.   Neurological:      General: No focal deficit present.      Mental Status: She is alert and oriented to person, place, and time.   Psychiatric:         Mood and Affect: Mood normal.         Behavior: Behavior normal.         Last Recorded Vitals  Blood pressure 101/55, pulse 86, temperature 37.6 °C (99.7 °F), temperature source Temporal, resp. rate 18, height 1.626 m (5' 4\"), weight 89.4 kg (197 lb), last menstrual period 05/14/2025, SpO2 99%.     Relevant Results        Results for orders placed or performed during the hospital encounter of 05/23/25 (from the past 24 hours)   Lactate   Result Value Ref Range     Lactate 1.1 0.4 - 2.0 mmol/L   CBC   Result Value Ref Range     WBC 9.9 4.4 - 11.3 x10*3/uL     nRBC 0.0 0.0 - 0.0 /100 WBCs     RBC 3.67 (L) 4.00 - 5.20 x10*6/uL     Hemoglobin 11.0 (L) 12.0 - 16.0 g/dL "     Hematocrit 34.9 (L) 36.0 - 46.0 %     MCV 95 80 - 100 fL     MCH 30.0 26.0 - 34.0 pg     MCHC 31.5 (L) 32.0 - 36.0 g/dL     RDW 13.3 11.5 - 14.5 %     Platelets 317 150 - 450 x10*3/uL   Basic metabolic panel   Result Value Ref Range     Glucose 99 74 - 99 mg/dL     Sodium 139 136 - 145 mmol/L     Potassium 3.8 3.5 - 5.3 mmol/L     Chloride 104 98 - 107 mmol/L     Bicarbonate 28 21 - 32 mmol/L     Anion Gap 11 10 - 20 mmol/L     Urea Nitrogen 7 6 - 23 mg/dL     Creatinine 0.64 0.50 - 1.05 mg/dL     eGFR >90 >60 mL/min/1.73m*2     Calcium 8.0 (L) 8.6 - 10.3 mg/dL            CT abdomen pelvis w IV contrast   Final Result   1.  Acute sigmoid diverticulitis with small contained   microperforation. No evidence of an abscess.             Signed by: Balbir Cote 5/23/2025 3:19 PM   Dictation workstation:   [id number]          Assessment & Plan  Acute diverticulitis     Abdominal pain     Leukocytosis     Tachycardia        Gretchen Haji is a 45 y.o. female with past medical history significant osteoarthritis, and psoriasis on Humira who presents to the ED with abdominal pain for the past 3 days.  States it is mainly left lower quadrant/left umbilical area and suprapubic area.  Rates it 10/10 in severity at home, says the pain improved in the ER after IV morphine. States the abdominal pain is worse with bowel movements and urination.  Denies any alleviating factors, states she tried Tylenol and warm compresses at home with no relief.  Reports history of gallbladder removal and hernia repair many years ago. Denies history of diverticulitis. Denies any blood in her stools or urine at home.      CODE STATUS: Full code     #Acute sigmoid diverticulitis with small microperforation  #LLQ, suprapubic, umbilical abdominal pain  #Leukocytosis, WBC 14.3  #Mildly tachycardic  #Mild hyponatremia, sodium 134  Admit as inpatient  Acute care surgery on consult  Patient afebrile, hemodynamically stable.  Urinalysis  unremarkable.  Renal function WNL, lipase WNL.  CT abdomen/pelvis shows acute sigmoid diverticulitis with small contained microperforation, no evidence of an abscess.  Patient given 1 L NS bolus in the ED, will start on continuous IV fluids  IV Zosyn every 6 hours  Lactate added onto lab work  Pain medication, nausea medication as needed  N.p.o. diet, can advance per surgical team  Q 8 vitals  CBC, BMP in the a.m.     Continue home medications as appropriate     Chronic conditions:  Obesity, anxiety/depression, MDD, PTSD, OA, psoriasis     #DVT prophylaxis  SCDs, ambulation          Patient fully evaluated 05/24  ,thorough record review performed of previous labs and notes from prior encounters. Plan discussed with interdisciplinary team, consults placed, appreciate input. Will continue current and repeat labs in the AM.          Discharge planning discussed with patient and care team. Therapy evaluations ordered. Patient aware and agreeable to current plan, continue plan as above.      I spent a total of 75 minutes on the date of the service which included preparing to see the patient, face-to-face patient care, completing clinical documentation, obtaining and/or reviewing separately obtained history, performing a medically appropriate examination, counseling and educating the patient/family/caregiver, ordering medications, tests, or procedures, communicating with other HCPs (not separately reported), independently interpreting results (not separately reported), communicating results to the patient/family/caregiver, and care coordination (not separately reported).         Neyda Coughlin                 Revision History    Patient fully evaluated 05/25 for   Assessment & Plan  Acute diverticulitis    Abdominal pain    Leukocytosis    Tachycardia      Patient with significant clinical improvement noted, patient medically cleared for discharge today. Patient seen resting in bed with head of bed elevated, no s/s or c/o  acute difficulties at this time. Vital signs for last 24 hours:  Temp:  [36.3 °C (97.3 °F)-37.1 °C (98.8 °F)] 36.5 °C (97.7 °F)  Heart Rate:  [76-96] 76  Resp:  [18] 18  BP: ()/(58-64) 101/59 Medications and labs reviewed-   Results for orders placed or performed during the hospital encounter of 05/23/25 (from the past 24 hours)   CBC and Auto Differential   Result Value Ref Range    WBC 7.1 4.4 - 11.3 x10*3/uL    nRBC 0.0 0.0 - 0.0 /100 WBCs    RBC 3.73 (L) 4.00 - 5.20 x10*6/uL    Hemoglobin 11.4 (L) 12.0 - 16.0 g/dL    Hematocrit 35.3 (L) 36.0 - 46.0 %    MCV 95 80 - 100 fL    MCH 30.6 26.0 - 34.0 pg    MCHC 32.3 32.0 - 36.0 g/dL    RDW 13.3 11.5 - 14.5 %    Platelets 333 150 - 450 x10*3/uL    Neutrophils % 59.1 40.0 - 80.0 %    Immature Granulocytes %, Automated 0.3 0.0 - 0.9 %    Lymphocytes % 29.0 13.0 - 44.0 %    Monocytes % 8.5 2.0 - 10.0 %    Eosinophils % 2.3 0.0 - 6.0 %    Basophils % 0.8 0.0 - 2.0 %    Neutrophils Absolute 4.20 1.20 - 7.70 x10*3/uL    Immature Granulocytes Absolute, Automated 0.02 0.00 - 0.70 x10*3/uL    Lymphocytes Absolute 2.06 1.20 - 4.80 x10*3/uL    Monocytes Absolute 0.60 0.10 - 1.00 x10*3/uL    Eosinophils Absolute 0.16 0.00 - 0.70 x10*3/uL    Basophils Absolute 0.06 0.00 - 0.10 x10*3/uL   Comprehensive Metabolic Panel   Result Value Ref Range    Glucose 105 (H) 74 - 99 mg/dL    Sodium 140 136 - 145 mmol/L    Potassium 3.5 3.5 - 5.3 mmol/L    Chloride 105 98 - 107 mmol/L    Bicarbonate 28 21 - 32 mmol/L    Anion Gap 11 10 - 20 mmol/L    Urea Nitrogen 5 (L) 6 - 23 mg/dL    Creatinine 0.51 0.50 - 1.05 mg/dL    eGFR >90 >60 mL/min/1.73m*2    Calcium 8.0 (L) 8.6 - 10.3 mg/dL    Albumin 3.4 3.4 - 5.0 g/dL    Alkaline Phosphatase 64 33 - 110 U/L    Total Protein 6.1 (L) 6.4 - 8.2 g/dL    AST 16 9 - 39 U/L    Bilirubin, Total 0.4 0.0 - 1.2 mg/dL    ALT 30 7 - 45 U/L      Patient recently received an antibiotic (last 12 hours)       Date/Time Action Medication Dose    05/25/25 3689  New Bag    piperacillin-tazobactam (Zosyn) 3.375 g in dextrose (iso) IV 50 mL 3.375 g    05/25/25 0545 New Bag    piperacillin-tazobactam (Zosyn) 3.375 g in dextrose (iso) IV 50 mL 3.375 g           No results found for the last 90 days.       Continue aggressive pulmonary hygiene and oral hygiene. Off loading as tolerated for skin integrity. No new complaints per patient, stable at time of exam.  Plan discussed with interdisciplinary team, no acute events overnight, patient denies chest pain, worsening SOB at this time. Ok to discharge, will continue current and repeat labs in the AM if patient still hospitalized. Patient aware and agreeable to current plan, continue plan as above.     I spent 60 minutes on the date of the service which included preparing to see the patient, face-to-face patient care, completing clinical documentation, obtaining and/or reviewing separately obtained history, performing a medically appropriate examination, counseling and educating the patient/family/caregiver, ordering medications, tests, or procedures, communicating with other HCPs (not separately reported), independently interpreting results (not separately reported), communicating results to the patient/family/caregiver, and care coordination (not separately reported  Pertinent Physical Exam At Time of Discharge  Physical Exam    Outpatient Follow-Up  Future Appointments   Date Time Provider Department Center   9/12/2025  9:00 AM HEATHER Jensen-CNP [id number]

## 2025-05-25 NOTE — CARE PLAN
The patient's goals for the shift include      The clinical goals for the shift include pt will be safe and comftorable      Problem: Pain - Adult  Goal: Verbalizes/displays adequate comfort level or baseline comfort level  Outcome: Progressing     Problem: Safety - Adult  Goal: Free from fall injury  Outcome: Progressing     Problem: Discharge Planning  Goal: Discharge to home or other facility with appropriate resources  Outcome: Progressing     Problem: Chronic Conditions and Co-morbidities  Goal: Patient's chronic conditions and co-morbidity symptoms are monitored and maintained or improved  Outcome: Progressing     Problem: Nutrition  Goal: Nutrient intake appropriate for maintaining nutritional needs  Outcome: Progressing     Problem: Pain  Goal: Takes deep breaths with improved pain control throughout the shift  Outcome: Progressing  Goal: Turns in bed with improved pain control throughout the shift  Outcome: Progressing  Goal: Walks with improved pain control throughout the shift  Outcome: Progressing  Goal: Performs ADL's with improved pain control throughout shift  Outcome: Progressing  Goal: Participates in PT with improved pain control throughout the shift  Outcome: Progressing  Goal: Free from opioid side effects throughout the shift  Outcome: Progressing  Goal: Free from acute confusion related to pain meds throughout the shift  Outcome: Progressing

## 2025-05-25 NOTE — DISCHARGE INSTRUCTIONS
Make sure to finish all your oral antibiotics.  Stick to a low fiber diet for the next 2 weeks. Avoid raw vegetables and the skin of fruits. Okay to eat canned fruit/peel fruit, but avoid anything with high fiber content. Make sure to drink plenty of fluids and stay hydrated. Normal activity as tolerated is encouraged.

## 2025-05-25 NOTE — PROGRESS NOTES
"Gretchen Haji is a 45 y.o. female on day 2 of admission presenting with Acute diverticulitis.    Subjective   Patient denies any abdominal pain, nausea, or vomiting. Passing a lot of gas.  Had a formed BM last night, which is very unusual for her. Back to having her chronic diarrhea this AM. Says she \"blew up\" the bathroom.       Objective     Physical Exam  Vitals reviewed.   Constitutional:       General: She is not in acute distress.     Appearance: She is not ill-appearing.      Comments: Adult female, appears stated age, well-appearing, well-groomed, sitting up in bed watching TV. Able to get in and out of bed unassisted and without pain   Cardiovascular:      Rate and Rhythm: Normal rate and regular rhythm.      Pulses: Normal pulses.   Pulmonary:      Effort: Pulmonary effort is normal. No respiratory distress.      Breath sounds: Normal breath sounds.   Abdominal:      General: Bowel sounds are increased. There is no distension.      Palpations: Abdomen is soft.      Tenderness: There is no abdominal tenderness.   Skin:     General: Skin is warm.      Coloration: Skin is not jaundiced.   Neurological:      Mental Status: She is alert and oriented to person, place, and time.   Psychiatric:         Mood and Affect: Mood normal.         Behavior: Behavior normal.         Last Recorded Vitals  Blood pressure 101/59, pulse 76, temperature 36.5 °C (97.7 °F), resp. rate 18, height 1.626 m (5' 4\"), weight 89.4 kg (197 lb), last menstrual period 05/14/2025, SpO2 96%.  Intake/Output last 3 Shifts:  I/O last 3 completed shifts:  In: 1496.7 (16.7 mL/kg) [I.V.:1296.7 (14.5 mL/kg); IV Piggyback:200]  Out: - (0 mL/kg)   Weight: 89.4 kg     Relevant Results  Results for orders placed or performed during the hospital encounter of 05/23/25 (from the past 24 hours)   CBC and Auto Differential   Result Value Ref Range    WBC 7.1 4.4 - 11.3 x10*3/uL    nRBC 0.0 0.0 - 0.0 /100 WBCs    RBC 3.73 (L) 4.00 - 5.20 x10*6/uL    " Hemoglobin 11.4 (L) 12.0 - 16.0 g/dL    Hematocrit 35.3 (L) 36.0 - 46.0 %    MCV 95 80 - 100 fL    MCH 30.6 26.0 - 34.0 pg    MCHC 32.3 32.0 - 36.0 g/dL    RDW 13.3 11.5 - 14.5 %    Platelets 333 150 - 450 x10*3/uL    Neutrophils % 59.1 40.0 - 80.0 %    Immature Granulocytes %, Automated 0.3 0.0 - 0.9 %    Lymphocytes % 29.0 13.0 - 44.0 %    Monocytes % 8.5 2.0 - 10.0 %    Eosinophils % 2.3 0.0 - 6.0 %    Basophils % 0.8 0.0 - 2.0 %    Neutrophils Absolute 4.20 1.20 - 7.70 x10*3/uL    Immature Granulocytes Absolute, Automated 0.02 0.00 - 0.70 x10*3/uL    Lymphocytes Absolute 2.06 1.20 - 4.80 x10*3/uL    Monocytes Absolute 0.60 0.10 - 1.00 x10*3/uL    Eosinophils Absolute 0.16 0.00 - 0.70 x10*3/uL    Basophils Absolute 0.06 0.00 - 0.10 x10*3/uL   Comprehensive Metabolic Panel   Result Value Ref Range    Glucose 105 (H) 74 - 99 mg/dL    Sodium 140 136 - 145 mmol/L    Potassium 3.5 3.5 - 5.3 mmol/L    Chloride 105 98 - 107 mmol/L    Bicarbonate 28 21 - 32 mmol/L    Anion Gap 11 10 - 20 mmol/L    Urea Nitrogen 5 (L) 6 - 23 mg/dL    Creatinine 0.51 0.50 - 1.05 mg/dL    eGFR >90 >60 mL/min/1.73m*2    Calcium 8.0 (L) 8.6 - 10.3 mg/dL    Albumin 3.4 3.4 - 5.0 g/dL    Alkaline Phosphatase 64 33 - 110 U/L    Total Protein 6.1 (L) 6.4 - 8.2 g/dL    AST 16 9 - 39 U/L    Bilirubin, Total 0.4 0.0 - 1.2 mg/dL    ALT 30 7 - 45 U/L           Assessment & Plan  Acute diverticulitis    Abdominal pain    Leukocytosis    Tachycardia    45 year old female with a history of chronic diarrhea, anxiety/depression, PTSD, psoriatic arthritis on Humira, and diverticulosis presented to [facility] ED on 5/23 with 3 day history of crampy abdominal pain that progressively worsened. She also developed weakness and chills. Diagnosed with acute sigmoid diverticulitis with microperf. Admitted to medicine with surgery on consult.     Impression:  Acute complicated sigmoid diverticulitis (microperforation without abscess)     Recommendations:  - no acute  surgical intervention at this time  - start low Fiber diet  - stop IVF  - continue IV Zosyn while inpatient; will need 10 day course of PO abx at discharge   - PRN IV analgesia/antiemetics per primary team  - would HOLD home Humira for this next week. Last dose 5/19. Would hold while still taking PO Abx  - remainder of care per medicine team; okay for other home PO meds     The patient will be seen and discussed with the attending surgeon Dr. Garcia    Dispo:  As long as patient is tolerating a low Fiber diet, she can be discharged this evening. Follow up as outpatient with Dr. Garcia in 2 weeks    I spent 20 minutes in the professional and overall care of this patient.      My Tidwell, HEATHER-CNP

## 2025-05-28 ENCOUNTER — PATIENT OUTREACH (OUTPATIENT)
Dept: PRIMARY CARE | Facility: CLINIC | Age: 45
End: 2025-05-28
Payer: COMMERCIAL

## 2025-05-28 NOTE — PROGRESS NOTES
Discharge facility: [facility]  Discharge diagnosis:  Acute diverticulitis     Issues Requiring Follow-Up  05/25- patient with no new complaints, no acute events overnight, diet advanced to full liquids, tolerating at time of exam. Will advance diet per general surgery. BSx4, hyperactive, states she has chronic diarrhea, episode today. Will deescalate IV antibiotics - zosyn to oral, probiotics added, will  repeat labs in the AM if still hospitalized.        Admission date: 5/23/25  Discharge date: 5/25/25    PCP Appointment Date: Unsuccessful attempts x2 to reach patient for PCP Follow-up, message sent to office  Specialist Appointment Date: 6/17/25 Rheum  Hospital Encounter and Summary: Linked    ED to Hosp-Admission (Discharged) with Bijan Ray DO; Nikita Saxena DO (05/23/2025)     Three  attempts were made to reach patient within two business days after discharge.  Unable to leave voicemail with contact information  due voicemail not set up yet.

## 2025-06-10 ENCOUNTER — PATIENT OUTREACH (OUTPATIENT)
Dept: PRIMARY CARE | Facility: CLINIC | Age: 45
End: 2025-06-10
Payer: COMMERCIAL

## 2025-06-10 NOTE — PROGRESS NOTES
Unable to reach patient for follow up call after recent hospitalization.   Left voicemail with call back number for patient to call if needed

## 2025-06-20 ENCOUNTER — APPOINTMENT (OUTPATIENT)
Dept: SURGERY | Facility: CLINIC | Age: 45
End: 2025-06-20
Payer: COMMERCIAL

## 2025-09-12 ENCOUNTER — APPOINTMENT (OUTPATIENT)
Dept: PRIMARY CARE | Facility: CLINIC | Age: 45
End: 2025-09-12
Payer: COMMERCIAL